# Patient Record
Sex: FEMALE | NOT HISPANIC OR LATINO | Employment: FULL TIME | URBAN - METROPOLITAN AREA
[De-identification: names, ages, dates, MRNs, and addresses within clinical notes are randomized per-mention and may not be internally consistent; named-entity substitution may affect disease eponyms.]

---

## 2023-11-03 DIAGNOSIS — E05.90 HYPERTHYROIDISM: ICD-10-CM

## 2023-11-03 DIAGNOSIS — E03.9 HYPOTHYROIDISM, UNSPECIFIED TYPE: Primary | ICD-10-CM

## 2023-11-03 PROBLEM — D17.1 LIPOMA OF BACK: Status: ACTIVE | Noted: 2023-11-03

## 2023-11-03 PROBLEM — G43.831: Status: ACTIVE | Noted: 2023-11-03

## 2023-11-03 PROBLEM — N73.9 FEMALE PELVIC INFLAMMATORY DISEASE: Status: ACTIVE | Noted: 2023-11-03

## 2023-11-03 PROBLEM — E83.52 HYPERCALCEMIA: Status: ACTIVE | Noted: 2023-11-03

## 2023-11-03 PROBLEM — D35.1 PARATHYROID ADENOMA: Status: ACTIVE | Noted: 2023-11-03

## 2023-11-03 PROBLEM — E06.3 AUTOIMMUNE THYROIDITIS: Status: ACTIVE | Noted: 2023-11-03

## 2023-11-03 PROBLEM — E66.01 MORBID (SEVERE) OBESITY DUE TO EXCESS CALORIES (MULTI): Status: ACTIVE | Noted: 2023-11-03

## 2023-11-03 PROBLEM — E55.9 VITAMIN D DEFICIENCY: Status: ACTIVE | Noted: 2023-11-03

## 2023-11-03 PROBLEM — E31.21: Status: ACTIVE | Noted: 2023-11-03

## 2023-11-03 PROBLEM — E21.0 PRIMARY HYPERPARATHYROIDISM (MULTI): Status: ACTIVE | Noted: 2023-11-03

## 2023-11-03 NOTE — TELEPHONE ENCOUNTER
Rx Request received. Med populated.  Last appointment over due for physical. LM for pt to call back, not sure who her pcp is, and she needs to schedule physical.      CARLY DELEON on 11/3/23 at 3:43 PM.

## 2023-11-06 RX ORDER — LEVOTHYROXINE SODIUM 100 UG/1
100 TABLET ORAL
Qty: 30 TABLET | Refills: 0 | OUTPATIENT
Start: 2023-11-06

## 2023-11-06 RX ORDER — HYDROCODONE BITARTRATE AND ACETAMINOPHEN 5; 325 MG/1; MG/1
1 TABLET ORAL EVERY 6 HOURS PRN
COMMUNITY
End: 2023-11-09 | Stop reason: WASHOUT

## 2023-11-06 RX ORDER — LEVOTHYROXINE SODIUM 100 UG/1
1 TABLET ORAL
COMMUNITY
End: 2023-11-08 | Stop reason: SDUPTHER

## 2023-11-06 RX ORDER — ERGOCALCIFEROL (VITAMIN D2) 50 MCG
1 CAPSULE ORAL DAILY
COMMUNITY
End: 2023-11-15 | Stop reason: WASHOUT

## 2023-11-06 RX ORDER — IBUPROFEN 200 MG
1 TABLET ORAL 3 TIMES DAILY PRN
COMMUNITY
End: 2024-03-07 | Stop reason: HOSPADM

## 2023-11-08 RX ORDER — LEVOTHYROXINE SODIUM 100 UG/1
100 TABLET ORAL
Qty: 30 TABLET | Refills: 0 | Status: SHIPPED | OUTPATIENT
Start: 2023-11-08 | End: 2023-11-15 | Stop reason: DRUGHIGH

## 2023-11-08 NOTE — TELEPHONE ENCOUNTER
Error - she did see me but last appt was 9/2022 when she was then advised to follow up for 6 week recheck of TSH. It it well over a year now. Rx refilled for 1 month.

## 2023-11-09 ENCOUNTER — PATIENT MESSAGE (OUTPATIENT)
Dept: PRIMARY CARE | Facility: CLINIC | Age: 33
End: 2023-11-09

## 2023-11-09 ENCOUNTER — OFFICE VISIT (OUTPATIENT)
Dept: PRIMARY CARE | Facility: CLINIC | Age: 33
End: 2023-11-09
Payer: COMMERCIAL

## 2023-11-09 VITALS
HEIGHT: 62 IN | SYSTOLIC BLOOD PRESSURE: 110 MMHG | DIASTOLIC BLOOD PRESSURE: 88 MMHG | WEIGHT: 239.8 LBS | TEMPERATURE: 95.4 F | OXYGEN SATURATION: 97 % | RESPIRATION RATE: 18 BRPM | BODY MASS INDEX: 44.13 KG/M2 | HEART RATE: 94 BPM

## 2023-11-09 DIAGNOSIS — G43.831: ICD-10-CM

## 2023-11-09 DIAGNOSIS — Z23 ENCOUNTER FOR IMMUNIZATION: ICD-10-CM

## 2023-11-09 DIAGNOSIS — E31.21: ICD-10-CM

## 2023-11-09 DIAGNOSIS — Z00.00 ANNUAL PHYSICAL EXAM: Primary | ICD-10-CM

## 2023-11-09 DIAGNOSIS — E66.01 MORBID (SEVERE) OBESITY DUE TO EXCESS CALORIES (MULTI): Primary | ICD-10-CM

## 2023-11-09 PROBLEM — E83.52 HYPERCALCEMIA: Status: RESOLVED | Noted: 2023-11-03 | Resolved: 2023-11-09

## 2023-11-09 PROBLEM — E05.90 HYPERTHYROIDISM: Status: RESOLVED | Noted: 2023-11-03 | Resolved: 2023-11-09

## 2023-11-09 PROBLEM — N73.9 FEMALE PELVIC INFLAMMATORY DISEASE: Status: RESOLVED | Noted: 2023-11-03 | Resolved: 2023-11-09

## 2023-11-09 PROBLEM — E06.3 AUTOIMMUNE THYROIDITIS: Status: RESOLVED | Noted: 2023-11-03 | Resolved: 2023-11-09

## 2023-11-09 PROCEDURE — 99395 PREV VISIT EST AGE 18-39: CPT | Performed by: FAMILY MEDICINE

## 2023-11-09 PROCEDURE — 90686 IIV4 VACC NO PRSV 0.5 ML IM: CPT | Performed by: FAMILY MEDICINE

## 2023-11-09 PROCEDURE — 90471 IMMUNIZATION ADMIN: CPT | Performed by: FAMILY MEDICINE

## 2023-11-09 RX ORDER — RIZATRIPTAN BENZOATE 10 MG/1
10 TABLET ORAL ONCE AS NEEDED
Qty: 9 TABLET | Refills: 3 | Status: SHIPPED | OUTPATIENT
Start: 2023-11-09 | End: 2024-11-08

## 2023-11-09 RX ORDER — PHENTERMINE AND TOPIRAMATE 3.75; 23 MG/1; MG/1
1 CAPSULE, EXTENDED RELEASE ORAL DAILY
Qty: 14 CAPSULE | Refills: 0 | Status: SHIPPED | OUTPATIENT
Start: 2023-11-09 | End: 2023-11-15 | Stop reason: WASHOUT

## 2023-11-09 RX ORDER — TOPIRAMATE 50 MG/1
50 TABLET, FILM COATED ORAL 2 TIMES DAILY
Qty: 60 TABLET | Refills: 6 | Status: SHIPPED | OUTPATIENT
Start: 2023-11-09 | End: 2024-11-08

## 2023-11-09 ASSESSMENT — PAIN SCALES - GENERAL: PAINLEVEL: 0-NO PAIN

## 2023-11-09 NOTE — PROGRESS NOTES
"History Of Present Illness  Tracy Villalobos is a 33 y.o. female presenting for \"Annual Exam (Physical).\"  Patient's last visit was 9/2022. She would like routine labs done.    She has hypothyroidism on unithyroid supplementation.  Her last TSH was elevated on 6/4/2022 at 17.32.  Her dose was adjusted and patient was to recheck TSH.  Patient was lost to follow-up.  She feels clinically euthyroid other than difficulty with weight loss.  She has a history of intermittent migraines.  Sometimes are difficult to abort with over-the-counter medications.  She has been on Maxalt in the past and reports that it is effective.  She is working on weight loss but Qsymia is too expensive.             Past Medical History  Patient Active Problem List    Diagnosis Date Noted    Hypothyroidism 11/03/2023    Lipoma of back 11/03/2023    MEN1 (multiple endocrine neoplasia) (CMS/[facility]) 11/03/2023    Migraine, menstrual, intractable, with status migrainosus 11/03/2023    Morbid (severe) obesity due to excess calories (CMS/[facility]) 11/03/2023    Parathyroid adenoma 11/03/2023    Primary hyperparathyroidism (CMS/[facility]) 11/03/2023    Vitamin D deficiency 11/03/2023    History of gestational diabetes mellitus 10/22/2015        Medications  Current Outpatient Medications on File Prior to Visit   Medication Sig Dispense Refill    calcium carb/vit D3/minerals (CALCIUM CARBONATE-VIT D3-MIN ORAL) Take 2 tablets by mouth 2 times a day.      ibuprofen 200 mg tablet Take 1 tablet (200 mg) by mouth 3 times a day as needed.      levonorgestrel (Mirena) 21 mcg/24 hours (8 yrs) 52 mg IUD 52 mg by intrauterine route 1 time.      [DISCONTINUED] ergocalciferol (Vitamin D-2) 50 MCG (2000 UT) capsule capsule Take 1 capsule (50 mcg) by mouth once daily.      [DISCONTINUED] HYDROcodone-acetaminophen (Norco) 5-325 mg tablet Take 1 tablet by mouth every 6 hours if needed.      [DISCONTINUED] Unithroid 100 mcg tablet Take 1 tablet (100 mcg) by mouth once daily in the " morning. Take before meals. 30 tablet 0     No current facility-administered medications on file prior to visit.        Surgical History  She has no past surgical history on file.     Social History  She reports that she quit smoking about 10 years ago. Her smoking use included cigarettes. She has never used smokeless tobacco. She reports current alcohol use. She reports that she does not use drugs.    Family History  Family History   Problem Relation Name Age of Onset    Other (MEN 1) Mother          requiring parathyroid removal. Sees     Hypertension Father      No Known Problems Brother      No Known Problems Brother      No Known Problems Daughter      Other (MEN 1) Mother's Brother          requiring parathyroid removal    Other (MEN 1) Maternal Grandmother          requiring parathyroid removal    Breast cancer Maternal Grandmother          Allergies  Penicillins    ROS  Negative, except in HPI     Last Recorded Vitals  /88   Pulse 94   Temp 35.2 °C (95.4 °F)   Resp 18   Wt 109 kg (239 lb 12.8 oz)   SpO2 97%   Body mass index is 43.86 kg/m².     Physical Exam  Vitals and nursing note reviewed.   Constitutional:       Appearance: Normal appearance.   HENT:      Head: Normocephalic.      Right Ear: Tympanic membrane normal.      Left Ear: Tympanic membrane normal.      Nose: Nose normal.      Mouth/Throat:      Mouth: Mucous membranes are moist.   Eyes:      Extraocular Movements: Extraocular movements intact.      Conjunctiva/sclera: Conjunctivae normal.      Pupils: Pupils are equal, round, and reactive to light.   Cardiovascular:      Rate and Rhythm: Normal rate and regular rhythm.      Heart sounds: Normal heart sounds.   Pulmonary:      Effort: Pulmonary effort is normal. No respiratory distress.      Breath sounds: Normal breath sounds.   Abdominal:      General: Abdomen is flat.      Palpations: Abdomen is soft.      Tenderness: There is no abdominal tenderness.   Musculoskeletal:       Cervical back: Neck supple.   Lymphadenopathy:      Cervical: No cervical adenopathy.   Skin:     General: Skin is warm and dry.      Findings: No rash.   Neurological:      General: No focal deficit present.      Mental Status: She is alert. Mental status is at baseline.      Coordination: Coordination normal.      Gait: Gait normal.      Deep Tendon Reflexes: Reflexes normal.   Psychiatric:         Mood and Affect: Mood normal.         Behavior: Behavior normal.         Relevant Results      Assessment/Plan   Tracy was seen today for annual exam.  Diagnoses and all orders for this visit:  Annual physical exam (Primary)  -     Comprehensive Metabolic Panel; Future  -     TSH with reflex to Free T4 if abnormal; Future  -     Hemoglobin A1C; Future  -     CBC; Future  -     Lipid Panel; Future  -     Referral to Gynecology; Future  -     Vitamin D 25-Hydroxy,Total (for eval of Vitamin D levels); Future  Encounter for immunization  -     Flu vaccine (IIV4) age 6 months and greater, preservative free  Migraine, menstrual, intractable, with status migrainosus  -     rizatriptan (Maxalt) 10 mg tablet; Take 1 tablet (10 mg) by mouth 1 time if needed for migraine. May repeat in 2 hours if unresolved. Do not exceed 30 mg in 24 hours.  MEN1 (multiple endocrine neoplasia) (CMS/HCC)  Comments:  will see Endo  BMI 40.0-44.9, adult (CMS/HCC)  -     Discontinue: phentermine-topiramate (Qsymia) 3.75-23 mg capsule, ER multiphase 24 hr; Take 1 capsule by mouth once daily for 14 days.  Other orders  -     Follow Up In Primary Care - Established; Future        Hector Hackett MD

## 2023-11-10 ENCOUNTER — LAB (OUTPATIENT)
Dept: LAB | Facility: LAB | Age: 33
End: 2023-11-10
Payer: COMMERCIAL

## 2023-11-10 DIAGNOSIS — Z00.00 ANNUAL PHYSICAL EXAM: ICD-10-CM

## 2023-11-10 LAB
25(OH)D3 SERPL-MCNC: 26 NG/ML (ref 31–100)
ALBUMIN SERPL-MCNC: 4.1 G/DL (ref 3.5–5)
ALP BLD-CCNC: 86 U/L (ref 35–125)
ALT SERPL-CCNC: 17 U/L (ref 5–40)
ANION GAP SERPL CALC-SCNC: 15 MMOL/L
AST SERPL-CCNC: 14 U/L (ref 5–40)
BILIRUB SERPL-MCNC: 0.4 MG/DL (ref 0.1–1.2)
BUN SERPL-MCNC: 11 MG/DL (ref 8–25)
CALCIUM SERPL-MCNC: 9.3 MG/DL (ref 8.5–10.4)
CHLORIDE SERPL-SCNC: 98 MMOL/L (ref 97–107)
CHOLEST SERPL-MCNC: 190 MG/DL (ref 133–200)
CHOLEST/HDLC SERPL: 3.8 {RATIO}
CO2 SERPL-SCNC: 23 MMOL/L (ref 24–31)
CREAT SERPL-MCNC: 0.8 MG/DL (ref 0.4–1.6)
ERYTHROCYTE [DISTWIDTH] IN BLOOD BY AUTOMATED COUNT: 12.3 % (ref 11.5–14.5)
EST. AVERAGE GLUCOSE BLD GHB EST-MCNC: 258 MG/DL
GFR SERPL CREATININE-BSD FRML MDRD: >90 ML/MIN/1.73M*2
GLUCOSE SERPL-MCNC: 325 MG/DL (ref 65–99)
HBA1C MFR BLD: 10.6 %
HCT VFR BLD AUTO: 40.7 % (ref 36–46)
HDLC SERPL-MCNC: 50 MG/DL
HGB BLD-MCNC: 13.1 G/DL (ref 12–16)
LDLC SERPL CALC-MCNC: 123 MG/DL (ref 65–130)
MCH RBC QN AUTO: 29.4 PG (ref 26–34)
MCHC RBC AUTO-ENTMCNC: 32.2 G/DL (ref 32–36)
MCV RBC AUTO: 91 FL (ref 80–100)
NRBC BLD-RTO: 0 /100 WBCS (ref 0–0)
PLATELET # BLD AUTO: 356 X10*3/UL (ref 150–450)
POTASSIUM SERPL-SCNC: 4.5 MMOL/L (ref 3.4–5.1)
PROT SERPL-MCNC: 7.3 G/DL (ref 5.9–7.9)
RBC # BLD AUTO: 4.46 X10*6/UL (ref 4–5.2)
SODIUM SERPL-SCNC: 136 MMOL/L (ref 133–145)
T4 FREE SERPL-MCNC: 1.3 NG/DL (ref 0.9–1.7)
TRIGL SERPL-MCNC: 84 MG/DL (ref 40–150)
TSH SERPL DL<=0.05 MIU/L-ACNC: 6.38 MIU/L (ref 0.27–4.2)
WBC # BLD AUTO: 8.7 X10*3/UL (ref 4.4–11.3)

## 2023-11-10 PROCEDURE — 84439 ASSAY OF FREE THYROXINE: CPT

## 2023-11-10 PROCEDURE — 83036 HEMOGLOBIN GLYCOSYLATED A1C: CPT

## 2023-11-10 PROCEDURE — 85027 COMPLETE CBC AUTOMATED: CPT

## 2023-11-10 PROCEDURE — 80053 COMPREHEN METABOLIC PANEL: CPT

## 2023-11-10 PROCEDURE — 84443 ASSAY THYROID STIM HORMONE: CPT

## 2023-11-10 PROCEDURE — 80061 LIPID PANEL: CPT

## 2023-11-10 PROCEDURE — 36415 COLL VENOUS BLD VENIPUNCTURE: CPT

## 2023-11-10 PROCEDURE — 82306 VITAMIN D 25 HYDROXY: CPT

## 2023-11-15 ENCOUNTER — TELEMEDICINE (OUTPATIENT)
Dept: PRIMARY CARE | Facility: CLINIC | Age: 33
End: 2023-11-15
Payer: COMMERCIAL

## 2023-11-15 DIAGNOSIS — E03.9 HYPOTHYROIDISM, UNSPECIFIED TYPE: ICD-10-CM

## 2023-11-15 DIAGNOSIS — E11.65 TYPE 2 DIABETES MELLITUS WITH HYPERGLYCEMIA, WITHOUT LONG-TERM CURRENT USE OF INSULIN (MULTI): Primary | ICD-10-CM

## 2023-11-15 PROCEDURE — 99213 OFFICE O/P EST LOW 20 MIN: CPT | Performed by: FAMILY MEDICINE

## 2023-11-15 RX ORDER — LEVOTHYROXINE SODIUM 112 UG/1
112 TABLET ORAL
Qty: 90 TABLET | Refills: 0 | Status: SHIPPED | OUTPATIENT
Start: 2023-11-15 | End: 2024-11-14

## 2023-11-15 RX ORDER — SEMAGLUTIDE 1.34 MG/ML
0.25 INJECTION, SOLUTION SUBCUTANEOUS
Qty: 1 EACH | Refills: 0 | Status: SHIPPED | OUTPATIENT
Start: 2023-11-15 | End: 2023-11-28 | Stop reason: SDUPTHER

## 2023-11-15 ASSESSMENT — ENCOUNTER SYMPTOMS
VISUAL CHANGE: 0
TREMORS: 0
POLYDIPSIA: 0
SWEATS: 0
SEIZURES: 0
HEADACHES: 1
WEIGHT LOSS: 0
HUNGER: 0
POLYPHAGIA: 0
WEAKNESS: 0
NERVOUS/ANXIOUS: 1
DIZZINESS: 0
BLURRED VISION: 0
SPEECH DIFFICULTY: 0
FATIGUE: 1
CONFUSION: 0
BLACKOUTS: 0

## 2023-11-15 ASSESSMENT — PAIN SCALES - GENERAL: PAINLEVEL: 0-NO PAIN

## 2023-11-15 NOTE — PROGRESS NOTES
With patient's permission this is a telemedicine visit with video and audio.  History Of Present Illness  Tracy Villalobos is a 33 y.o. female who calls in for Blood Work (Blood work follow up and discuss med changes ).  Calling to discuss newly diagnosed type 2 diabetes.  Hemoglobin A1c was 10.6 on routine testing on 11/10/2023.  We discussed lifestyle changes and modification, surveillance.  Also discussed treatment options.   Hypothyroidism was also off.  TSH was elevated at 6.38 with a normal T4.    She has an upcoming appointment with endocrinology.        Past Medical History  She has a past medical history of Diabetes (CMS/[facility]).    Medications  Current Outpatient Medications   Medication Instructions    calcium carb/vit D3/minerals (CALCIUM CARBONATE-VIT D3-MIN ORAL) 2 tablets, oral, 2 times daily    ibuprofen 200 mg tablet 1 tablet, oral, 3 times daily PRN    levonorgestrel (Mirena) 21 mcg/24 hours (8 yrs) 52 mg IUD 1 each, intrauterine, Once    levothyroxine (UNITHROID) 112 mcg, oral, Daily before breakfast    Ozempic 0.25 mg, subcutaneous, Weekly    rizatriptan (MAXALT) 10 mg, oral, Once as needed, May repeat in 2 hours if unresolved. Do not exceed 30 mg in 24 hours.    topiramate (TOPAMAX) 50 mg, oral, 2 times daily        Surgical History  She has no past surgical history on file.     Social History  She reports that she quit smoking about 10 years ago. Her smoking use included cigarettes. She has never used smokeless tobacco. She reports current alcohol use. She reports that she does not use drugs.    Family History  Family History   Problem Relation Name Age of Onset    Other (MEN 1) Mother          requiring parathyroid removal. Sees     Hypertension Father      No Known Problems Brother      No Known Problems Brother      No Known Problems Daughter      Other (MEN 1) Mother's Brother          requiring parathyroid removal    Other (MEN 1) Maternal Grandmother          requiring parathyroid  removal    Breast cancer Maternal Grandmother          Allergies  Penicillins    On video:     Appearance: Normal appearance.      Effort: Respiratory effort is normal. Speaking in full sentences. No respiratory distress.     Mood and Affect: Mood normal.         Thought Content: Thought content normal.         Judgment: Judgment normal.      Last Recorded Vitals  There were no vitals taken for this visit.  (Vitals are taken by patient at home, if any reported)    Relevant Results  Component      Latest Ref Rng 11/10/2023   GLUCOSE      65 - 99 mg/dL 325 (H)    SODIUM      133 - 145 mmol/L 136    POTASSIUM      3.4 - 5.1 mmol/L 4.5    CHLORIDE      97 - 107 mmol/L 98    Bicarbonate      24 - 31 mmol/L 23 (L)    Blood Urea Nitrogen      8 - 25 mg/dL 11    Creatinine      0.40 - 1.60 mg/dL 0.80    EGFR      >60 mL/min/1.73m*2 >90    Calcium      8.5 - 10.4 mg/dL 9.3    Albumin      3.5 - 5.0 g/dL 4.1    Alkaline Phosphatase      35 - 125 U/L 86    Total Protein      5.9 - 7.9 g/dL 7.3    AST      5 - 40 U/L 14    Bilirubin Total      0.1 - 1.2 mg/dL 0.4    ALT      5 - 40 U/L 17    Anion Gap      <=19 mmol/L 15    WBC      4.4 - 11.3 x10*3/uL 8.7    nRBC      0.0 - 0.0 /100 WBCs 0.0    RBC      4.00 - 5.20 x10*6/uL 4.46    HEMOGLOBIN      12.0 - 16.0 g/dL 13.1    HEMATOCRIT      36.0 - 46.0 % 40.7    MCV      80 - 100 fL 91    MCH      26.0 - 34.0 pg 29.4    MCHC      32.0 - 36.0 g/dL 32.2    RED CELL DISTRIBUTION WIDTH      11.5 - 14.5 % 12.3    Platelets      150 - 450 x10*3/uL 356    CHOLESTEROL      133 - 200 mg/dL 190    HDL CHOLESTEROL      >50.0 mg/dL 50.0 (L)    Cholesterol/HDL Ratio      SEE COMMENT  3.8    LDL Calculated      65 - 130 mg/dL 123    TRIGLYCERIDES      40 - 150 mg/dL 84    Hemoglobin A1C      See below % 10.6 (H)    Estimated Average Glucose      Not Established mg/dL 258    Thyroid Stimulating Hormone      0.27 - 4.20 mIU/L 6.38 (H)    Vitamin D, 25-Hydroxy, Total      31 - 100 ng/mL 26 (L)     Thyroxine, Free      0.90 - 1.70 ng/dL 1.30       Legend:  (H) High  (L) Low    Assessment/Plan   Tracy was seen today for blood work.  Diagnoses and all orders for this visit:  Type 2 diabetes mellitus with hyperglycemia, without long-term current use of insulin (CMS/[facility]) (Primary)  -     semaglutide (Ozempic) 0.25 mg or 0.5 mg(2 mg/1.5 mL) pen injector; Inject 0.25 mg under the skin 1 (one) time per week.  -     Hemoglobin A1C; Future  Hypothyroidism, unspecified type  -     levothyroxine (Unithroid) 112 mcg tablet; Take 1 tablet (112 mcg) by mouth once daily in the morning. Take before meals.  -     Tsh With Reflex To Free T4 If Abnormal; Future        Trial of Ozempic to see if insurance covers it.  She will be seeing endocrinology  who can then take over management of her diabetes and thyroid disease.  Hector Hackett MD

## 2023-11-16 ENCOUNTER — PATIENT MESSAGE (OUTPATIENT)
Dept: PRIMARY CARE | Facility: CLINIC | Age: 33
End: 2023-11-16
Payer: COMMERCIAL

## 2023-11-16 DIAGNOSIS — E11.65 TYPE 2 DIABETES MELLITUS WITH HYPERGLYCEMIA, WITHOUT LONG-TERM CURRENT USE OF INSULIN (MULTI): ICD-10-CM

## 2023-11-20 ENCOUNTER — TELEPHONE (OUTPATIENT)
Dept: PRIMARY CARE | Facility: CLINIC | Age: 33
End: 2023-11-20

## 2023-11-28 RX ORDER — SEMAGLUTIDE 1.34 MG/ML
0.25 INJECTION, SOLUTION SUBCUTANEOUS
Qty: 1 EACH | Refills: 0 | Status: SHIPPED | OUTPATIENT
Start: 2023-11-28 | End: 2023-12-28

## 2023-12-15 ENCOUNTER — APPOINTMENT (OUTPATIENT)
Dept: OBSTETRICS AND GYNECOLOGY | Facility: CLINIC | Age: 33
End: 2023-12-15
Payer: COMMERCIAL

## 2023-12-22 ENCOUNTER — LAB (OUTPATIENT)
Dept: LAB | Facility: LAB | Age: 33
End: 2023-12-22
Payer: COMMERCIAL

## 2023-12-22 ENCOUNTER — APPOINTMENT (OUTPATIENT)
Dept: OBSTETRICS AND GYNECOLOGY | Facility: CLINIC | Age: 33
End: 2023-12-22
Payer: COMMERCIAL

## 2023-12-22 DIAGNOSIS — E03.9 HYPOTHYROIDISM, UNSPECIFIED TYPE: ICD-10-CM

## 2023-12-22 LAB — TSH SERPL DL<=0.05 MIU/L-ACNC: 2.2 MIU/L (ref 0.27–4.2)

## 2023-12-22 PROCEDURE — 36415 COLL VENOUS BLD VENIPUNCTURE: CPT

## 2023-12-22 PROCEDURE — 84443 ASSAY THYROID STIM HORMONE: CPT

## 2024-01-26 ENCOUNTER — OFFICE VISIT (OUTPATIENT)
Dept: OBSTETRICS AND GYNECOLOGY | Facility: CLINIC | Age: 34
End: 2024-01-26
Payer: COMMERCIAL

## 2024-01-26 ENCOUNTER — HOSPITAL ENCOUNTER (OUTPATIENT)
Dept: RADIOLOGY | Facility: CLINIC | Age: 34
Discharge: HOME | End: 2024-01-26
Payer: COMMERCIAL

## 2024-01-26 VITALS — HEIGHT: 62 IN | WEIGHT: 209 LBS | BODY MASS INDEX: 38.46 KG/M2

## 2024-01-26 DIAGNOSIS — Z01.419 ENCOUNTER FOR ANNUAL ROUTINE GYNECOLOGICAL EXAMINATION: Primary | ICD-10-CM

## 2024-01-26 DIAGNOSIS — N89.8 VAGINAL ODOR: ICD-10-CM

## 2024-01-26 DIAGNOSIS — Z30.431 INTRAUTERINE DEVICE SURVEILLANCE: ICD-10-CM

## 2024-01-26 DIAGNOSIS — Z11.51 SCREENING FOR HUMAN PAPILLOMAVIRUS (HPV): ICD-10-CM

## 2024-01-26 DIAGNOSIS — Z20.2 POSSIBLE EXPOSURE TO STD: ICD-10-CM

## 2024-01-26 PROCEDURE — 99385 PREV VISIT NEW AGE 18-39: CPT

## 2024-01-26 PROCEDURE — 1036F TOBACCO NON-USER: CPT

## 2024-01-26 PROCEDURE — 87661 TRICHOMONAS VAGINALIS AMPLIF: CPT

## 2024-01-26 PROCEDURE — 76856 US EXAM PELVIC COMPLETE: CPT

## 2024-01-26 PROCEDURE — 87800 DETECT AGNT MULT DNA DIREC: CPT

## 2024-01-26 PROCEDURE — 88175 CYTOPATH C/V AUTO FLUID REDO: CPT | Mod: TC

## 2024-01-26 PROCEDURE — 3008F BODY MASS INDEX DOCD: CPT

## 2024-01-26 PROCEDURE — 87624 HPV HI-RISK TYP POOLED RSLT: CPT

## 2024-01-26 PROCEDURE — 87205 SMEAR GRAM STAIN: CPT | Mod: WESLAB

## 2024-01-26 ASSESSMENT — ENCOUNTER SYMPTOMS
FEVER: 0
COLOR CHANGE: 0
VOMITING: 0
FATIGUE: 0
DYSURIA: 0
DIZZINESS: 0
UNEXPECTED WEIGHT CHANGE: 0
DEPRESSION: 0
SHORTNESS OF BREATH: 0
HEADACHES: 0
OCCASIONAL FEELINGS OF UNSTEADINESS: 0
LOSS OF SENSATION IN FEET: 0
NAUSEA: 0
ABDOMINAL PAIN: 0
CHILLS: 0
COUGH: 0

## 2024-01-26 ASSESSMENT — PAIN SCALES - GENERAL: PAINLEVEL: 0-NO PAIN

## 2024-01-26 ASSESSMENT — LIFESTYLE VARIABLES
SKIP TO QUESTIONS 9-10: 1
AUDIT-C TOTAL SCORE: 2
HOW OFTEN DO YOU HAVE A DRINK CONTAINING ALCOHOL: 2-4 TIMES A MONTH
HOW OFTEN DO YOU HAVE SIX OR MORE DRINKS ON ONE OCCASION: NEVER
HOW MANY STANDARD DRINKS CONTAINING ALCOHOL DO YOU HAVE ON A TYPICAL DAY: 1 OR 2

## 2024-01-26 ASSESSMENT — PATIENT HEALTH QUESTIONNAIRE - PHQ9
1. LITTLE INTEREST OR PLEASURE IN DOING THINGS: NOT AT ALL
SUM OF ALL RESPONSES TO PHQ9 QUESTIONS 1 & 2: 0
2. FEELING DOWN, DEPRESSED OR HOPELESS: NOT AT ALL

## 2024-01-26 NOTE — PROGRESS NOTES
"Subjective   Tracy Villalobos is a 33 y.o. female who is here for a routine GYN exam. New patient with the practice. Had Mirena IUD inserted  at outside facility; has regular menses once monthly with IUD, has always been like this since insertion; duration 7-8 days, heavy first 1-3 days then lightens. Denies pelvic pains. No recent sexual activity however previous partner cheated and she wants to ensure no STI exposures. Does note some intermittent vaginal odor. Denies abnormal discharge, itching, or burning today. Denies urinary symptoms. Denies breast changes or concerns.     Complaints:  see HPI  Periods: regular  Dysmenorrhea:  none    Current contraception: Mirena IUD  History of abnormal Pap smear: no  History of abnormal mammogram: no      OB History          2    Para   1    Term   1            AB   1    Living   1         SAB        IAB        Ectopic        Multiple        Live Births   1                  Review of Systems   Constitutional:  Negative for chills, fatigue, fever and unexpected weight change.   Respiratory:  Negative for cough and shortness of breath.    Gastrointestinal:  Negative for abdominal pain, nausea and vomiting.   Genitourinary:  Negative for dyspareunia, dysuria, pelvic pain and vaginal discharge.        + vaginal odor   Skin:  Negative for color change and rash.   Neurological:  Negative for dizziness and headaches.       Objective   Ht 1.575 m (5' 2\")   Wt 94.8 kg (209 lb)   LMP 2024   BMI 38.23 kg/m²        General:   Alert and oriented, in no acute distress   Neck: Supple. No visible thyromegaly.    Breast/Axilla: Normal to palpation bilaterally without masses, skin changes, or nipple discharge.    Abdomen: Soft, non-tender, without masses or organomegaly   Vulva: Normal architecture without erythema, masses, or lesions.    Vagina: Normal mucosa without lesions, masses, or atrophy. Menstrual blood mixed with small amount yellow discharge. Swab obtained "    Cervix: Normal without masses, lesions, or signs of cervicitis; pap performed with STI testing; unable to visualize IUD strings   Uterus: Normal, mobile, non-enlarged uterus, non-tender   Adnexa: Normal without masses or lesions, non-tender   Pelvic Floor Normal    Psych Normal affect. Normal mood.      Assessment/Plan   -Due for pap smear, obtained.  -Vaginal odor symptoms intermittently; swab obtained to rule out for yeast/BV infection.  -STI testing obtained to rule out possible exposures with partner hx.  -Mirena IUD since 2021; inserted at outside facility; unable to visualize strings on exam; pelvic ultrasound ordered to ensure appropriate positioning.   -She would like to obtain baseline mammogram at 33 yo. Fam hx (grandma) of breast cancer.    Riya Solano PA-C

## 2024-01-27 LAB
CLUE CELLS VAG LPF-#/AREA: PRESENT /[LPF]
NUGENT SCORE: 9
YEAST VAG WET PREP-#/AREA: ABNORMAL

## 2024-01-29 ENCOUNTER — CLINICAL SUPPORT (OUTPATIENT)
Dept: PRIMARY CARE | Facility: CLINIC | Age: 34
End: 2024-01-29
Payer: COMMERCIAL

## 2024-01-29 DIAGNOSIS — Z11.1 SCREENING EXAMINATION FOR PULMONARY TUBERCULOSIS: ICD-10-CM

## 2024-01-29 DIAGNOSIS — B96.89 BACTERIAL VAGINOSIS: Primary | ICD-10-CM

## 2024-01-29 DIAGNOSIS — N76.0 BACTERIAL VAGINOSIS: Primary | ICD-10-CM

## 2024-01-29 RX ORDER — METRONIDAZOLE 7.5 MG/G
GEL VAGINAL DAILY
Qty: 70 G | Refills: 0 | Status: SHIPPED | OUTPATIENT
Start: 2024-01-29 | End: 2024-02-03

## 2024-01-30 LAB
C TRACH RRNA SPEC QL NAA+PROBE: NEGATIVE
N GONORRHOEA DNA SPEC QL PROBE+SIG AMP: NEGATIVE
T VAGINALIS RRNA SPEC QL NAA+PROBE: NEGATIVE

## 2024-01-31 ENCOUNTER — CLINICAL SUPPORT (OUTPATIENT)
Dept: PRIMARY CARE | Facility: CLINIC | Age: 34
End: 2024-01-31
Payer: COMMERCIAL

## 2024-01-31 DIAGNOSIS — Z11.1 VISIT FOR TB SKIN TEST: ICD-10-CM

## 2024-01-31 LAB
INDURATION: 0 MM
TB SKIN TEST: NEGATIVE

## 2024-02-05 ENCOUNTER — CLINICAL SUPPORT (OUTPATIENT)
Dept: PRIMARY CARE | Facility: CLINIC | Age: 34
End: 2024-02-05
Payer: COMMERCIAL

## 2024-02-05 DIAGNOSIS — Z11.1 VISIT FOR TB SKIN TEST: Primary | ICD-10-CM

## 2024-02-05 PROCEDURE — 86580 TB INTRADERMAL TEST: CPT | Performed by: FAMILY MEDICINE

## 2024-02-07 ENCOUNTER — CLINICAL SUPPORT (OUTPATIENT)
Dept: PRIMARY CARE | Facility: CLINIC | Age: 34
End: 2024-02-07
Payer: COMMERCIAL

## 2024-02-07 DIAGNOSIS — Z11.1 VISIT FOR TB SKIN TEST: ICD-10-CM

## 2024-02-07 LAB
INDURATION: 0 MM
TB SKIN TEST: NEGATIVE

## 2024-02-07 NOTE — PROGRESS NOTES
Pt came in to have TB skin test read. Test was negative and no induration present. Results reports have been printed for pt.

## 2024-02-12 LAB
CYTOLOGY CMNT CVX/VAG CYTO-IMP: NORMAL
HPV HR 12 DNA GENITAL QL NAA+PROBE: NEGATIVE
HPV HR GENOTYPES PNL CVX NAA+PROBE: POSITIVE
HPV16 DNA SPEC QL NAA+PROBE: NEGATIVE
HPV18 DNA SPEC QL NAA+PROBE: POSITIVE
LAB AP CONTRACEPTIVE HISTORY: NORMAL
LAB AP HPV GENOTYPE QUESTION: YES
LAB AP HPV HR: NORMAL
LAB AP PAP ADDITIONAL TESTS: NORMAL
LABORATORY COMMENT REPORT: NORMAL
LMP START DATE: NORMAL
PATH REPORT.TOTAL CANCER: NORMAL

## 2024-03-06 ENCOUNTER — HOSPITAL ENCOUNTER (INPATIENT)
Facility: HOSPITAL | Age: 34
End: 2024-03-06
Attending: SURGERY | Admitting: SURGERY
Payer: COMMERCIAL

## 2024-03-06 ENCOUNTER — APPOINTMENT (OUTPATIENT)
Dept: RADIOLOGY | Facility: HOSPITAL | Age: 34
End: 2024-03-06
Payer: COMMERCIAL

## 2024-03-06 ENCOUNTER — TELEPHONE (OUTPATIENT)
Dept: PRIMARY CARE | Facility: CLINIC | Age: 34
End: 2024-03-06

## 2024-03-06 ENCOUNTER — HOSPITAL ENCOUNTER (OUTPATIENT)
Facility: HOSPITAL | Age: 34
Setting detail: OBSERVATION
Discharge: HOME | End: 2024-03-07
Attending: STUDENT IN AN ORGANIZED HEALTH CARE EDUCATION/TRAINING PROGRAM | Admitting: SURGERY
Payer: COMMERCIAL

## 2024-03-06 DIAGNOSIS — K35.80 ACUTE APPENDICITIS, UNCOMPLICATED: Primary | ICD-10-CM

## 2024-03-06 PROBLEM — K37 APPENDICITIS: Status: ACTIVE | Noted: 2024-03-06

## 2024-03-06 LAB
ALBUMIN SERPL-MCNC: 4.2 G/DL (ref 3.5–5)
ALP BLD-CCNC: 95 U/L (ref 35–125)
ALT SERPL-CCNC: 20 U/L (ref 5–40)
ANION GAP SERPL CALC-SCNC: 14 MMOL/L
APPEARANCE UR: ABNORMAL
AST SERPL-CCNC: 16 U/L (ref 5–40)
BASOPHILS # BLD AUTO: 0.05 X10*3/UL (ref 0–0.1)
BASOPHILS NFR BLD AUTO: 0.3 %
BILIRUB SERPL-MCNC: 0.6 MG/DL (ref 0.1–1.2)
BILIRUB UR STRIP.AUTO-MCNC: NEGATIVE MG/DL
BUN SERPL-MCNC: 10 MG/DL (ref 8–25)
CALCIUM SERPL-MCNC: 10.2 MG/DL (ref 8.5–10.4)
CHLORIDE SERPL-SCNC: 100 MMOL/L (ref 97–107)
CO2 SERPL-SCNC: 21 MMOL/L (ref 24–31)
COLOR UR: ABNORMAL
CREAT SERPL-MCNC: 0.7 MG/DL (ref 0.4–1.6)
EGFRCR SERPLBLD CKD-EPI 2021: >90 ML/MIN/1.73M*2
EOSINOPHIL # BLD AUTO: 0.12 X10*3/UL (ref 0–0.7)
EOSINOPHIL NFR BLD AUTO: 0.7 %
ERYTHROCYTE [DISTWIDTH] IN BLOOD BY AUTOMATED COUNT: 11.9 % (ref 11.5–14.5)
GLUCOSE SERPL-MCNC: 123 MG/DL (ref 65–99)
GLUCOSE UR STRIP.AUTO-MCNC: NORMAL MG/DL
HCG SERPL-ACNC: <1 MIU/ML
HCT VFR BLD AUTO: 37.7 % (ref 36–46)
HGB BLD-MCNC: 12.7 G/DL (ref 12–16)
IMM GRANULOCYTES # BLD AUTO: 0.07 X10*3/UL (ref 0–0.7)
IMM GRANULOCYTES NFR BLD AUTO: 0.4 % (ref 0–0.9)
KETONES UR STRIP.AUTO-MCNC: ABNORMAL MG/DL
LEUKOCYTE ESTERASE UR QL STRIP.AUTO: ABNORMAL
LIPASE SERPL-CCNC: 18 U/L (ref 16–63)
LYMPHOCYTES # BLD AUTO: 1.73 X10*3/UL (ref 1.2–4.8)
LYMPHOCYTES NFR BLD AUTO: 10 %
MCH RBC QN AUTO: 30 PG (ref 26–34)
MCHC RBC AUTO-ENTMCNC: 33.7 G/DL (ref 32–36)
MCV RBC AUTO: 89 FL (ref 80–100)
MONOCYTES # BLD AUTO: 1.28 X10*3/UL (ref 0.1–1)
MONOCYTES NFR BLD AUTO: 7.4 %
MUCOUS THREADS #/AREA URNS AUTO: ABNORMAL /LPF
NEUTROPHILS # BLD AUTO: 14.05 X10*3/UL (ref 1.2–7.7)
NEUTROPHILS NFR BLD AUTO: 81.2 %
NITRITE UR QL STRIP.AUTO: NEGATIVE
NRBC BLD-RTO: 0 /100 WBCS (ref 0–0)
PH UR STRIP.AUTO: 5 [PH]
PLATELET # BLD AUTO: 385 X10*3/UL (ref 150–450)
POTASSIUM SERPL-SCNC: 3.6 MMOL/L (ref 3.4–5.1)
PROT SERPL-MCNC: 8.2 G/DL (ref 5.9–7.9)
PROT UR STRIP.AUTO-MCNC: NEGATIVE MG/DL
RBC # BLD AUTO: 4.24 X10*6/UL (ref 4–5.2)
RBC # UR STRIP.AUTO: NEGATIVE /UL
RBC #/AREA URNS AUTO: ABNORMAL /HPF
SODIUM SERPL-SCNC: 135 MMOL/L (ref 133–145)
SP GR UR STRIP.AUTO: 1.01
SQUAMOUS #/AREA URNS AUTO: ABNORMAL /HPF
UROBILINOGEN UR STRIP.AUTO-MCNC: NORMAL MG/DL
WBC # BLD AUTO: 17.3 X10*3/UL (ref 4.4–11.3)
WBC #/AREA URNS AUTO: ABNORMAL /HPF

## 2024-03-06 PROCEDURE — 96365 THER/PROPH/DIAG IV INF INIT: CPT

## 2024-03-06 PROCEDURE — 96375 TX/PRO/DX INJ NEW DRUG ADDON: CPT

## 2024-03-06 PROCEDURE — 84702 CHORIONIC GONADOTROPIN TEST: CPT | Performed by: STUDENT IN AN ORGANIZED HEALTH CARE EDUCATION/TRAINING PROGRAM

## 2024-03-06 PROCEDURE — 2500000004 HC RX 250 GENERAL PHARMACY W/ HCPCS (ALT 636 FOR OP/ED): Performed by: SURGERY

## 2024-03-06 PROCEDURE — 85025 COMPLETE CBC W/AUTO DIFF WBC: CPT | Performed by: STUDENT IN AN ORGANIZED HEALTH CARE EDUCATION/TRAINING PROGRAM

## 2024-03-06 PROCEDURE — 83690 ASSAY OF LIPASE: CPT | Performed by: STUDENT IN AN ORGANIZED HEALTH CARE EDUCATION/TRAINING PROGRAM

## 2024-03-06 PROCEDURE — 99285 EMERGENCY DEPT VISIT HI MDM: CPT | Mod: 25

## 2024-03-06 PROCEDURE — 80053 COMPREHEN METABOLIC PANEL: CPT | Performed by: STUDENT IN AN ORGANIZED HEALTH CARE EDUCATION/TRAINING PROGRAM

## 2024-03-06 PROCEDURE — 96366 THER/PROPH/DIAG IV INF ADDON: CPT

## 2024-03-06 PROCEDURE — 74177 CT ABD & PELVIS W/CONTRAST: CPT | Performed by: RADIOLOGY

## 2024-03-06 PROCEDURE — 96361 HYDRATE IV INFUSION ADD-ON: CPT

## 2024-03-06 PROCEDURE — 96376 TX/PRO/DX INJ SAME DRUG ADON: CPT

## 2024-03-06 PROCEDURE — 96368 THER/DIAG CONCURRENT INF: CPT

## 2024-03-06 PROCEDURE — G0378 HOSPITAL OBSERVATION PER HR: HCPCS

## 2024-03-06 PROCEDURE — 87086 URINE CULTURE/COLONY COUNT: CPT | Mod: TRILAB,WESLAB | Performed by: STUDENT IN AN ORGANIZED HEALTH CARE EDUCATION/TRAINING PROGRAM

## 2024-03-06 PROCEDURE — 81001 URINALYSIS AUTO W/SCOPE: CPT | Performed by: STUDENT IN AN ORGANIZED HEALTH CARE EDUCATION/TRAINING PROGRAM

## 2024-03-06 PROCEDURE — 36415 COLL VENOUS BLD VENIPUNCTURE: CPT | Performed by: STUDENT IN AN ORGANIZED HEALTH CARE EDUCATION/TRAINING PROGRAM

## 2024-03-06 PROCEDURE — 76705 ECHO EXAM OF ABDOMEN: CPT | Performed by: RADIOLOGY

## 2024-03-06 PROCEDURE — 74177 CT ABD & PELVIS W/CONTRAST: CPT

## 2024-03-06 PROCEDURE — 2500000004 HC RX 250 GENERAL PHARMACY W/ HCPCS (ALT 636 FOR OP/ED): Mod: JZ | Performed by: STUDENT IN AN ORGANIZED HEALTH CARE EDUCATION/TRAINING PROGRAM

## 2024-03-06 PROCEDURE — 76705 ECHO EXAM OF ABDOMEN: CPT

## 2024-03-06 PROCEDURE — 2550000001 HC RX 255 CONTRASTS: Performed by: STUDENT IN AN ORGANIZED HEALTH CARE EDUCATION/TRAINING PROGRAM

## 2024-03-06 RX ORDER — MORPHINE SULFATE 2 MG/ML
2 INJECTION, SOLUTION INTRAMUSCULAR; INTRAVENOUS EVERY 4 HOURS PRN
Status: DISCONTINUED | OUTPATIENT
Start: 2024-03-06 | End: 2024-03-07 | Stop reason: HOSPADM

## 2024-03-06 RX ORDER — MORPHINE SULFATE 4 MG/ML
4 INJECTION, SOLUTION INTRAMUSCULAR; INTRAVENOUS ONCE
Status: COMPLETED | OUTPATIENT
Start: 2024-03-06 | End: 2024-03-06

## 2024-03-06 RX ORDER — ACETAMINOPHEN 325 MG/1
650 TABLET ORAL EVERY 4 HOURS PRN
Status: DISCONTINUED | OUTPATIENT
Start: 2024-03-06 | End: 2024-03-07 | Stop reason: HOSPADM

## 2024-03-06 RX ORDER — CIPROFLOXACIN 2 MG/ML
400 INJECTION, SOLUTION INTRAVENOUS ONCE
Status: COMPLETED | OUTPATIENT
Start: 2024-03-06 | End: 2024-03-06

## 2024-03-06 RX ORDER — CIPROFLOXACIN 2 MG/ML
400 INJECTION, SOLUTION INTRAVENOUS EVERY 12 HOURS
Status: DISCONTINUED | OUTPATIENT
Start: 2024-03-07 | End: 2024-03-07 | Stop reason: HOSPADM

## 2024-03-06 RX ORDER — SODIUM CHLORIDE, SODIUM LACTATE, POTASSIUM CHLORIDE, CALCIUM CHLORIDE 600; 310; 30; 20 MG/100ML; MG/100ML; MG/100ML; MG/100ML
75 INJECTION, SOLUTION INTRAVENOUS CONTINUOUS
Status: DISCONTINUED | OUTPATIENT
Start: 2024-03-06 | End: 2024-03-07

## 2024-03-06 RX ORDER — METRONIDAZOLE 500 MG/100ML
500 INJECTION, SOLUTION INTRAVENOUS EVERY 8 HOURS
Status: DISCONTINUED | OUTPATIENT
Start: 2024-03-07 | End: 2024-03-07 | Stop reason: HOSPADM

## 2024-03-06 RX ORDER — ACETAMINOPHEN 650 MG/1
650 SUPPOSITORY RECTAL EVERY 4 HOURS PRN
Status: DISCONTINUED | OUTPATIENT
Start: 2024-03-06 | End: 2024-03-07 | Stop reason: HOSPADM

## 2024-03-06 RX ORDER — ACETAMINOPHEN 160 MG/5ML
650 SOLUTION ORAL EVERY 4 HOURS PRN
Status: DISCONTINUED | OUTPATIENT
Start: 2024-03-06 | End: 2024-03-07 | Stop reason: HOSPADM

## 2024-03-06 RX ORDER — METRONIDAZOLE 500 MG/100ML
500 INJECTION, SOLUTION INTRAVENOUS ONCE
Status: COMPLETED | OUTPATIENT
Start: 2024-03-06 | End: 2024-03-06

## 2024-03-06 RX ORDER — ONDANSETRON HYDROCHLORIDE 2 MG/ML
4 INJECTION, SOLUTION INTRAVENOUS EVERY 6 HOURS PRN
Status: DISCONTINUED | OUTPATIENT
Start: 2024-03-06 | End: 2024-03-07 | Stop reason: HOSPADM

## 2024-03-06 RX ORDER — ONDANSETRON HYDROCHLORIDE 2 MG/ML
4 INJECTION, SOLUTION INTRAVENOUS ONCE
Status: COMPLETED | OUTPATIENT
Start: 2024-03-06 | End: 2024-03-06

## 2024-03-06 RX ADMIN — METRONIDAZOLE 500 MG: 500 INJECTION, SOLUTION INTRAVENOUS at 20:06

## 2024-03-06 RX ADMIN — MORPHINE SULFATE 4 MG: 4 INJECTION, SOLUTION INTRAMUSCULAR; INTRAVENOUS at 20:15

## 2024-03-06 RX ADMIN — MORPHINE SULFATE 2 MG: 2 INJECTION, SOLUTION INTRAMUSCULAR; INTRAVENOUS at 23:47

## 2024-03-06 RX ADMIN — SODIUM CHLORIDE, SODIUM LACTATE, POTASSIUM CHLORIDE, AND CALCIUM CHLORIDE 75 ML/HR: 600; 310; 30; 20 INJECTION, SOLUTION INTRAVENOUS at 23:47

## 2024-03-06 RX ADMIN — CIPROFLOXACIN 400 MG: 400 INJECTION, SOLUTION INTRAVENOUS at 20:11

## 2024-03-06 RX ADMIN — MORPHINE SULFATE 4 MG: 4 INJECTION, SOLUTION INTRAMUSCULAR; INTRAVENOUS at 19:01

## 2024-03-06 RX ADMIN — ONDANSETRON 4 MG: 2 INJECTION INTRAMUSCULAR; INTRAVENOUS at 19:01

## 2024-03-06 RX ADMIN — IOHEXOL 75 ML: 350 INJECTION, SOLUTION INTRAVENOUS at 20:01

## 2024-03-06 RX ADMIN — SODIUM CHLORIDE 1000 ML: 900 INJECTION, SOLUTION INTRAVENOUS at 19:01

## 2024-03-06 ASSESSMENT — COLUMBIA-SUICIDE SEVERITY RATING SCALE - C-SSRS
1. IN THE PAST MONTH, HAVE YOU WISHED YOU WERE DEAD OR WISHED YOU COULD GO TO SLEEP AND NOT WAKE UP?: NO
2. HAVE YOU ACTUALLY HAD ANY THOUGHTS OF KILLING YOURSELF?: NO
6. HAVE YOU EVER DONE ANYTHING, STARTED TO DO ANYTHING, OR PREPARED TO DO ANYTHING TO END YOUR LIFE?: NO

## 2024-03-06 ASSESSMENT — PAIN DESCRIPTION - PAIN TYPE: TYPE: ACUTE PAIN

## 2024-03-06 ASSESSMENT — PAIN - FUNCTIONAL ASSESSMENT
PAIN_FUNCTIONAL_ASSESSMENT: 0-10
PAIN_FUNCTIONAL_ASSESSMENT: 0-10

## 2024-03-06 ASSESSMENT — PAIN DESCRIPTION - LOCATION
LOCATION: ABDOMEN
LOCATION: ABDOMEN

## 2024-03-06 ASSESSMENT — PAIN SCALES - GENERAL
PAINLEVEL_OUTOF10: 6
PAINLEVEL_OUTOF10: 4

## 2024-03-06 ASSESSMENT — PAIN DESCRIPTION - ORIENTATION
ORIENTATION: RIGHT
ORIENTATION: RIGHT;LEFT

## 2024-03-07 ENCOUNTER — ANESTHESIA EVENT (OUTPATIENT)
Dept: OPERATING ROOM | Facility: HOSPITAL | Age: 34
End: 2024-03-07
Payer: COMMERCIAL

## 2024-03-07 ENCOUNTER — PHARMACY VISIT (OUTPATIENT)
Dept: PHARMACY | Facility: CLINIC | Age: 34
End: 2024-03-07
Payer: MEDICARE

## 2024-03-07 ENCOUNTER — ANESTHESIA (OUTPATIENT)
Dept: OPERATING ROOM | Facility: HOSPITAL | Age: 34
End: 2024-03-07
Payer: COMMERCIAL

## 2024-03-07 VITALS
RESPIRATION RATE: 16 BRPM | OXYGEN SATURATION: 98 % | SYSTOLIC BLOOD PRESSURE: 99 MMHG | DIASTOLIC BLOOD PRESSURE: 60 MMHG | WEIGHT: 200.62 LBS | BODY MASS INDEX: 36.92 KG/M2 | TEMPERATURE: 97.9 F | HEIGHT: 62 IN | HEART RATE: 70 BPM

## 2024-03-07 PROBLEM — K35.80 ACUTE APPENDICITIS, UNCOMPLICATED: Status: ACTIVE | Noted: 2024-03-06

## 2024-03-07 LAB
GLUCOSE BLD MANUAL STRIP-MCNC: 119 MG/DL (ref 74–99)
GLUCOSE BLD MANUAL STRIP-MCNC: 166 MG/DL (ref 74–99)

## 2024-03-07 PROCEDURE — G0378 HOSPITAL OBSERVATION PER HR: HCPCS

## 2024-03-07 PROCEDURE — 96366 THER/PROPH/DIAG IV INF ADDON: CPT | Mod: 59

## 2024-03-07 PROCEDURE — 2500000001 HC RX 250 WO HCPCS SELF ADMINISTERED DRUGS (ALT 637 FOR MEDICARE OP): Performed by: SURGERY

## 2024-03-07 PROCEDURE — 3600000004 HC OR TIME - INITIAL BASE CHARGE - PROCEDURE LEVEL FOUR: Performed by: SURGERY

## 2024-03-07 PROCEDURE — 96376 TX/PRO/DX INJ SAME DRUG ADON: CPT | Mod: 59

## 2024-03-07 PROCEDURE — 2500000005 HC RX 250 GENERAL PHARMACY W/O HCPCS: Performed by: ANESTHESIOLOGY

## 2024-03-07 PROCEDURE — 88304 TISSUE EXAM BY PATHOLOGIST: CPT | Mod: TC | Performed by: SURGERY

## 2024-03-07 PROCEDURE — 7100000001 HC RECOVERY ROOM TIME - INITIAL BASE CHARGE: Performed by: SURGERY

## 2024-03-07 PROCEDURE — 82947 ASSAY GLUCOSE BLOOD QUANT: CPT

## 2024-03-07 PROCEDURE — 3700000002 HC GENERAL ANESTHESIA TIME - EACH INCREMENTAL 1 MINUTE: Performed by: SURGERY

## 2024-03-07 PROCEDURE — 2500000004 HC RX 250 GENERAL PHARMACY W/ HCPCS (ALT 636 FOR OP/ED): Performed by: SURGERY

## 2024-03-07 PROCEDURE — 2500000005 HC RX 250 GENERAL PHARMACY W/O HCPCS: Performed by: SURGERY

## 2024-03-07 PROCEDURE — 2500000004 HC RX 250 GENERAL PHARMACY W/ HCPCS (ALT 636 FOR OP/ED): Performed by: ANESTHESIOLOGY

## 2024-03-07 PROCEDURE — 88304 TISSUE EXAM BY PATHOLOGIST: CPT | Performed by: PATHOLOGY

## 2024-03-07 PROCEDURE — 44970 LAPAROSCOPY APPENDECTOMY: CPT | Performed by: PHYSICIAN ASSISTANT

## 2024-03-07 PROCEDURE — RXMED WILLOW AMBULATORY MEDICATION CHARGE

## 2024-03-07 PROCEDURE — 3700000001 HC GENERAL ANESTHESIA TIME - INITIAL BASE CHARGE: Performed by: SURGERY

## 2024-03-07 PROCEDURE — 44970 LAPAROSCOPY APPENDECTOMY: CPT | Performed by: SURGERY

## 2024-03-07 PROCEDURE — 7100000002 HC RECOVERY ROOM TIME - EACH INCREMENTAL 1 MINUTE: Performed by: SURGERY

## 2024-03-07 PROCEDURE — 3600000009 HC OR TIME - EACH INCREMENTAL 1 MINUTE - PROCEDURE LEVEL FOUR: Performed by: SURGERY

## 2024-03-07 PROCEDURE — 2500000004 HC RX 250 GENERAL PHARMACY W/ HCPCS (ALT 636 FOR OP/ED): Mod: JZ | Performed by: SURGERY

## 2024-03-07 PROCEDURE — 99222 1ST HOSP IP/OBS MODERATE 55: CPT | Performed by: SURGERY

## 2024-03-07 PROCEDURE — A4550 SURGICAL TRAYS: HCPCS | Performed by: SURGERY

## 2024-03-07 PROCEDURE — 2720000007 HC OR 272 NO HCPCS: Performed by: SURGERY

## 2024-03-07 RX ORDER — SODIUM CHLORIDE, SODIUM LACTATE, POTASSIUM CHLORIDE, CALCIUM CHLORIDE 600; 310; 30; 20 MG/100ML; MG/100ML; MG/100ML; MG/100ML
100 INJECTION, SOLUTION INTRAVENOUS CONTINUOUS
Status: DISCONTINUED | OUTPATIENT
Start: 2024-03-07 | End: 2024-03-07 | Stop reason: HOSPADM

## 2024-03-07 RX ORDER — FENTANYL CITRATE 50 UG/ML
12.5 INJECTION, SOLUTION INTRAMUSCULAR; INTRAVENOUS EVERY 5 MIN PRN
Status: DISCONTINUED | OUTPATIENT
Start: 2024-03-07 | End: 2024-03-07 | Stop reason: HOSPADM

## 2024-03-07 RX ORDER — ONDANSETRON HYDROCHLORIDE 2 MG/ML
4 INJECTION, SOLUTION INTRAVENOUS ONCE AS NEEDED
Status: DISCONTINUED | OUTPATIENT
Start: 2024-03-07 | End: 2024-03-07 | Stop reason: HOSPADM

## 2024-03-07 RX ORDER — ACETAMINOPHEN 325 MG/1
975 TABLET ORAL ONCE
Status: DISCONTINUED | OUTPATIENT
Start: 2024-03-07 | End: 2024-03-07 | Stop reason: HOSPADM

## 2024-03-07 RX ORDER — GABAPENTIN 300 MG/1
300 CAPSULE ORAL ONCE
Status: COMPLETED | OUTPATIENT
Start: 2024-03-07 | End: 2024-03-07

## 2024-03-07 RX ORDER — SODIUM CHLORIDE, SODIUM LACTATE, POTASSIUM CHLORIDE, CALCIUM CHLORIDE 600; 310; 30; 20 MG/100ML; MG/100ML; MG/100ML; MG/100ML
INJECTION, SOLUTION INTRAVENOUS CONTINUOUS PRN
Status: DISCONTINUED | OUTPATIENT
Start: 2024-03-07 | End: 2024-03-07

## 2024-03-07 RX ORDER — FENTANYL CITRATE 50 UG/ML
INJECTION, SOLUTION INTRAMUSCULAR; INTRAVENOUS AS NEEDED
Status: DISCONTINUED | OUTPATIENT
Start: 2024-03-07 | End: 2024-03-07

## 2024-03-07 RX ORDER — LABETALOL HYDROCHLORIDE 5 MG/ML
5 INJECTION, SOLUTION INTRAVENOUS ONCE AS NEEDED
Status: DISCONTINUED | OUTPATIENT
Start: 2024-03-07 | End: 2024-03-07 | Stop reason: HOSPADM

## 2024-03-07 RX ORDER — ACETAMINOPHEN 500 MG
1000 TABLET ORAL EVERY 6 HOURS PRN
Qty: 20 TABLET | Refills: 0 | Status: SHIPPED | OUTPATIENT
Start: 2024-03-07

## 2024-03-07 RX ORDER — NORETHINDRONE AND ETHINYL ESTRADIOL 0.5-0.035
50 KIT ORAL ONCE
Status: COMPLETED | OUTPATIENT
Start: 2024-03-07 | End: 2024-03-07

## 2024-03-07 RX ORDER — DEXAMETHASONE SODIUM PHOSPHATE 4 MG/ML
INJECTION, SOLUTION INTRA-ARTICULAR; INTRALESIONAL; INTRAMUSCULAR; INTRAVENOUS; SOFT TISSUE AS NEEDED
Status: DISCONTINUED | OUTPATIENT
Start: 2024-03-07 | End: 2024-03-07

## 2024-03-07 RX ORDER — VECURONIUM BROMIDE FOR INJECTION 1 MG/ML
INJECTION, POWDER, LYOPHILIZED, FOR SOLUTION INTRAVENOUS AS NEEDED
Status: DISCONTINUED | OUTPATIENT
Start: 2024-03-07 | End: 2024-03-07

## 2024-03-07 RX ORDER — BUPIVACAINE HYDROCHLORIDE 5 MG/ML
INJECTION, SOLUTION PERINEURAL AS NEEDED
Status: DISCONTINUED | OUTPATIENT
Start: 2024-03-07 | End: 2024-03-07 | Stop reason: HOSPADM

## 2024-03-07 RX ORDER — IBUPROFEN 400 MG/1
400 TABLET ORAL EVERY 8 HOURS PRN
Status: DISCONTINUED | OUTPATIENT
Start: 2024-03-07 | End: 2024-03-07 | Stop reason: HOSPADM

## 2024-03-07 RX ORDER — ALBUTEROL SULFATE 0.83 MG/ML
2.5 SOLUTION RESPIRATORY (INHALATION) ONCE AS NEEDED
Status: DISCONTINUED | OUTPATIENT
Start: 2024-03-07 | End: 2024-03-07 | Stop reason: HOSPADM

## 2024-03-07 RX ORDER — LIDOCAINE HYDROCHLORIDE AND EPINEPHRINE 10; 10 MG/ML; UG/ML
INJECTION, SOLUTION INFILTRATION; PERINEURAL AS NEEDED
Status: DISCONTINUED | OUTPATIENT
Start: 2024-03-07 | End: 2024-03-07 | Stop reason: HOSPADM

## 2024-03-07 RX ORDER — DOCUSATE SODIUM 100 MG/1
100 CAPSULE, LIQUID FILLED ORAL DAILY
Qty: 5 CAPSULE | Refills: 0 | Status: SHIPPED | OUTPATIENT
Start: 2024-03-07

## 2024-03-07 RX ORDER — ACETAMINOPHEN 325 MG/1
650 TABLET ORAL EVERY 4 HOURS PRN
Status: DISCONTINUED | OUTPATIENT
Start: 2024-03-07 | End: 2024-03-07

## 2024-03-07 RX ORDER — HYDRALAZINE HYDROCHLORIDE 20 MG/ML
5 INJECTION INTRAMUSCULAR; INTRAVENOUS EVERY 30 MIN PRN
Status: DISCONTINUED | OUTPATIENT
Start: 2024-03-07 | End: 2024-03-07 | Stop reason: HOSPADM

## 2024-03-07 RX ORDER — NEOSTIGMINE METHYLSULFATE 1 MG/ML
INJECTION, SOLUTION INTRAVENOUS AS NEEDED
Status: DISCONTINUED | OUTPATIENT
Start: 2024-03-07 | End: 2024-03-07

## 2024-03-07 RX ORDER — MEPERIDINE HYDROCHLORIDE 25 MG/ML
12.5 INJECTION INTRAMUSCULAR; INTRAVENOUS; SUBCUTANEOUS EVERY 10 MIN PRN
Status: DISCONTINUED | OUTPATIENT
Start: 2024-03-07 | End: 2024-03-07 | Stop reason: HOSPADM

## 2024-03-07 RX ORDER — OXYCODONE HYDROCHLORIDE 5 MG/1
5 TABLET ORAL EVERY 6 HOURS PRN
Qty: 15 TABLET | Refills: 0 | Status: SHIPPED | OUTPATIENT
Start: 2024-03-07

## 2024-03-07 RX ORDER — LIDOCAINE HYDROCHLORIDE 20 MG/ML
INJECTION, SOLUTION INFILTRATION; PERINEURAL AS NEEDED
Status: DISCONTINUED | OUTPATIENT
Start: 2024-03-07 | End: 2024-03-07

## 2024-03-07 RX ORDER — MIDAZOLAM HYDROCHLORIDE 1 MG/ML
INJECTION, SOLUTION INTRAMUSCULAR; INTRAVENOUS AS NEEDED
Status: DISCONTINUED | OUTPATIENT
Start: 2024-03-07 | End: 2024-03-07

## 2024-03-07 RX ORDER — FENTANYL CITRATE 50 UG/ML
50 INJECTION, SOLUTION INTRAMUSCULAR; INTRAVENOUS EVERY 5 MIN PRN
Status: DISCONTINUED | OUTPATIENT
Start: 2024-03-07 | End: 2024-03-07 | Stop reason: HOSPADM

## 2024-03-07 RX ORDER — PROPOFOL 10 MG/ML
INJECTION, EMULSION INTRAVENOUS AS NEEDED
Status: DISCONTINUED | OUTPATIENT
Start: 2024-03-07 | End: 2024-03-07

## 2024-03-07 RX ORDER — GLYCOPYRROLATE 0.2 MG/ML
INJECTION INTRAMUSCULAR; INTRAVENOUS AS NEEDED
Status: DISCONTINUED | OUTPATIENT
Start: 2024-03-07 | End: 2024-03-07

## 2024-03-07 RX ADMIN — CIPROFLOXACIN 400 MG: 2 INJECTION, SOLUTION INTRAVENOUS at 08:26

## 2024-03-07 RX ADMIN — FENTANYL CITRATE 50 MCG: 0.05 INJECTION, SOLUTION INTRAMUSCULAR; INTRAVENOUS at 10:48

## 2024-03-07 RX ADMIN — ONDANSETRON 4 MG: 2 INJECTION INTRAMUSCULAR; INTRAVENOUS at 03:32

## 2024-03-07 RX ADMIN — PROPOFOL 170 MG: 10 INJECTION, EMULSION INTRAVENOUS at 09:45

## 2024-03-07 RX ADMIN — NEOSTIGMINE METHYLSULFATE 3.5 MG: 1 INJECTION, SOLUTION INTRAVENOUS at 10:41

## 2024-03-07 RX ADMIN — METRONIDAZOLE 500 MG: 500 INJECTION, SOLUTION INTRAVENOUS at 03:32

## 2024-03-07 RX ADMIN — MIDAZOLAM HYDROCHLORIDE 2 MG: 1 INJECTION, SOLUTION INTRAMUSCULAR; INTRAVENOUS at 09:44

## 2024-03-07 RX ADMIN — VECURONIUM BROMIDE 7 MG: 10 INJECTION, POWDER, FOR SOLUTION INTRAVENOUS at 09:45

## 2024-03-07 RX ADMIN — DEXAMETHASONE SODIUM PHOSPHATE 4 MG: 4 INJECTION, SOLUTION INTRA-ARTICULAR; INTRALESIONAL; INTRAMUSCULAR; INTRAVENOUS; SOFT TISSUE at 10:19

## 2024-03-07 RX ADMIN — MORPHINE SULFATE 2 MG: 2 INJECTION, SOLUTION INTRAMUSCULAR; INTRAVENOUS at 03:32

## 2024-03-07 RX ADMIN — ACETAMINOPHEN 650 MG: 325 TABLET ORAL at 09:36

## 2024-03-07 RX ADMIN — LIDOCAINE HYDROCHLORIDE 50 MG: 20 INJECTION, SOLUTION INFILTRATION; PERINEURAL at 09:45

## 2024-03-07 RX ADMIN — HYDROMORPHONE HYDROCHLORIDE 0.5 MG: 1 INJECTION, SOLUTION INTRAMUSCULAR; INTRAVENOUS; SUBCUTANEOUS at 11:31

## 2024-03-07 RX ADMIN — EPHEDRINE SULFATE 50 MG: 50 INJECTION, SOLUTION INTRAVENOUS at 11:36

## 2024-03-07 RX ADMIN — FENTANYL CITRATE 50 MCG: 0.05 INJECTION, SOLUTION INTRAMUSCULAR; INTRAVENOUS at 10:13

## 2024-03-07 RX ADMIN — GABAPENTIN 300 MG: 300 CAPSULE ORAL at 09:36

## 2024-03-07 RX ADMIN — SODIUM CHLORIDE, POTASSIUM CHLORIDE, SODIUM LACTATE AND CALCIUM CHLORIDE: 600; 310; 30; 20 INJECTION, SOLUTION INTRAVENOUS at 09:37

## 2024-03-07 RX ADMIN — PROPOFOL 20 MG: 10 INJECTION, EMULSION INTRAVENOUS at 10:36

## 2024-03-07 RX ADMIN — FENTANYL CITRATE 50 MCG: 0.05 INJECTION, SOLUTION INTRAMUSCULAR; INTRAVENOUS at 10:44

## 2024-03-07 RX ADMIN — FENTANYL CITRATE 50 MCG: 0.05 INJECTION, SOLUTION INTRAMUSCULAR; INTRAVENOUS at 09:45

## 2024-03-07 RX ADMIN — PROPOFOL 10 MG: 10 INJECTION, EMULSION INTRAVENOUS at 10:40

## 2024-03-07 RX ADMIN — CIPROFLOXACIN: 2 INJECTION, SOLUTION INTRAVENOUS at 09:53

## 2024-03-07 RX ADMIN — GLYCOPYRROLATE 0.5 MG: 0.2 INJECTION INTRAMUSCULAR; INTRAVENOUS at 10:41

## 2024-03-07 SDOH — SOCIAL STABILITY: SOCIAL INSECURITY: DOES ANYONE TRY TO KEEP YOU FROM HAVING/CONTACTING OTHER FRIENDS OR DOING THINGS OUTSIDE YOUR HOME?: NO

## 2024-03-07 SDOH — SOCIAL STABILITY: SOCIAL INSECURITY: ARE THERE ANY APPARENT SIGNS OF INJURIES/BEHAVIORS THAT COULD BE RELATED TO ABUSE/NEGLECT?: NO

## 2024-03-07 SDOH — SOCIAL STABILITY: SOCIAL INSECURITY: WERE YOU ABLE TO COMPLETE ALL THE BEHAVIORAL HEALTH SCREENINGS?: YES

## 2024-03-07 SDOH — SOCIAL STABILITY: SOCIAL INSECURITY: HAS ANYONE EVER THREATENED TO HURT YOUR FAMILY OR YOUR PETS?: NO

## 2024-03-07 SDOH — HEALTH STABILITY: MENTAL HEALTH: CURRENT SMOKER: 0

## 2024-03-07 SDOH — SOCIAL STABILITY: SOCIAL INSECURITY: DO YOU FEEL ANYONE HAS EXPLOITED OR TAKEN ADVANTAGE OF YOU FINANCIALLY OR OF YOUR PERSONAL PROPERTY?: NO

## 2024-03-07 SDOH — SOCIAL STABILITY: SOCIAL INSECURITY: ABUSE: ADULT

## 2024-03-07 SDOH — SOCIAL STABILITY: SOCIAL INSECURITY: ARE YOU OR HAVE YOU BEEN THREATENED OR ABUSED PHYSICALLY, EMOTIONALLY, OR SEXUALLY BY ANYONE?: NO

## 2024-03-07 SDOH — SOCIAL STABILITY: SOCIAL INSECURITY: HAVE YOU HAD THOUGHTS OF HARMING ANYONE ELSE?: NO

## 2024-03-07 SDOH — SOCIAL STABILITY: SOCIAL INSECURITY: DO YOU FEEL UNSAFE GOING BACK TO THE PLACE WHERE YOU ARE LIVING?: NO

## 2024-03-07 ASSESSMENT — ENCOUNTER SYMPTOMS
PSYCHIATRIC NEGATIVE: 1
HEMATOLOGIC/LYMPHATIC NEGATIVE: 1
ABDOMINAL PAIN: 1
EYES NEGATIVE: 1
CARDIOVASCULAR NEGATIVE: 1
NEUROLOGICAL NEGATIVE: 1
ENDOCRINE NEGATIVE: 1
MUSCULOSKELETAL NEGATIVE: 1
RESPIRATORY NEGATIVE: 1
CONSTITUTIONAL NEGATIVE: 1

## 2024-03-07 ASSESSMENT — PAIN - FUNCTIONAL ASSESSMENT
PAIN_FUNCTIONAL_ASSESSMENT: 0-10

## 2024-03-07 ASSESSMENT — COGNITIVE AND FUNCTIONAL STATUS - GENERAL
PATIENT BASELINE BEDBOUND: NO
MOBILITY SCORE: 24
MOBILITY SCORE: 24
DAILY ACTIVITIY SCORE: 24
DAILY ACTIVITIY SCORE: 24

## 2024-03-07 ASSESSMENT — ACTIVITIES OF DAILY LIVING (ADL)
HEARING - RIGHT EAR: FUNCTIONAL
PATIENT'S MEMORY ADEQUATE TO SAFELY COMPLETE DAILY ACTIVITIES?: YES
WALKS IN HOME: INDEPENDENT
ADEQUATE_TO_COMPLETE_ADL: YES
DRESSING YOURSELF: INDEPENDENT
BATHING: INDEPENDENT
LACK_OF_TRANSPORTATION: NO
HEARING - LEFT EAR: FUNCTIONAL
GROOMING: INDEPENDENT
TOILETING: INDEPENDENT
FEEDING YOURSELF: INDEPENDENT
JUDGMENT_ADEQUATE_SAFELY_COMPLETE_DAILY_ACTIVITIES: YES

## 2024-03-07 ASSESSMENT — PAIN SCALES - GENERAL
PAINLEVEL_OUTOF10: 5 - MODERATE PAIN
PAINLEVEL_OUTOF10: 4
PAINLEVEL_OUTOF10: 3
PAIN_LEVEL: 6
PAINLEVEL_OUTOF10: 7
PAINLEVEL_OUTOF10: 6
PAINLEVEL_OUTOF10: 6
PAINLEVEL_OUTOF10: 3

## 2024-03-07 ASSESSMENT — PATIENT HEALTH QUESTIONNAIRE - PHQ9
SUM OF ALL RESPONSES TO PHQ9 QUESTIONS 1 & 2: 0
1. LITTLE INTEREST OR PLEASURE IN DOING THINGS: NOT AT ALL
2. FEELING DOWN, DEPRESSED OR HOPELESS: NOT AT ALL

## 2024-03-07 ASSESSMENT — PAIN DESCRIPTION - LOCATION
LOCATION: ABDOMEN

## 2024-03-07 ASSESSMENT — LIFESTYLE VARIABLES
HOW MANY STANDARD DRINKS CONTAINING ALCOHOL DO YOU HAVE ON A TYPICAL DAY: 1 OR 2
SKIP TO QUESTIONS 9-10: 1
AUDIT-C TOTAL SCORE: 2
AUDIT-C TOTAL SCORE: 2
HOW OFTEN DO YOU HAVE 6 OR MORE DRINKS ON ONE OCCASION: NEVER
HOW OFTEN DO YOU HAVE A DRINK CONTAINING ALCOHOL: 2-4 TIMES A MONTH

## 2024-03-07 ASSESSMENT — CHA2DS2 SCORE
AGE IN YEARS: <65
SEX: FEMALE

## 2024-03-07 NOTE — DISCHARGE INSTRUCTIONS
May shower, glue will peel off on it's own    Apply Band-aids to wounds if you have drainage    May apply ice to the wound for 15 minutes at a time, this can be done for the first 2 days after surgery    No lifting greater than 15 pounds for 4 weeks    Eat cracker, soup, light sandwich the night of surgery. May eat regular food the day after surgery.     May take stool softeners (colace daily for 10 days). If you still do not poop after 10 days take miralax or milk of magnesia until you poop.     Take tylenol and ibuprofen with oxycodone as needed for pain    Call the clinic Monday to make an appointment in 2 weeks

## 2024-03-07 NOTE — CARE PLAN
Problem: Pain  Goal: Takes deep breaths with improved pain control throughout the shift  Outcome: Progressing  Goal: Turns in bed with improved pain control throughout the shift  Outcome: Progressing  Goal: Walks with improved pain control throughout the shift  Outcome: Progressing  Goal: Performs ADL's with improved pain control throughout shift  Outcome: Progressing  Goal: Participates in PT with improved pain control throughout the shift  Outcome: Progressing  Goal: Free from opioid side effects throughout the shift  Outcome: Progressing  Goal: Free from acute confusion related to pain meds throughout the shift  Outcome: Progressing   The patient's goals for the shift include manage pain    The clinical goals for the shift include manage pain

## 2024-03-07 NOTE — ANESTHESIA PROCEDURE NOTES
Airway  Date/Time: 3/7/2024 9:46 AM  Urgency: elective    Airway not difficult    Staffing  Performed: attending   Authorized by: Davis Gomez MD    Performed by: Davis Gomez MD  Patient location during procedure: OR    Indications and Patient Condition  Indications for airway management: anesthesia  Sedation level: deep  Preoxygenated: yes  Patient position: sniffing  Mask difficulty assessment: 1 - vent by mask    Final Airway Details  Final airway type: endotracheal airway      Successful airway: ETT  Cuffed: yes   Successful intubation technique: direct laryngoscopy  Facilitating devices/methods: intubating stylet  Endotracheal tube insertion site: oral  Blade: Jose Alberto  Blade size: #3  ETT size (mm): 7.0  Cormack-Lehane Classification: grade I - full view of glottis  Placement verified by: chest auscultation and capnometry   Measured from: lips  ETT to lips (cm): 21  Number of attempts at approach: 1

## 2024-03-07 NOTE — ED PROVIDER NOTES
HPI   Chief Complaint   Patient presents with    Abdominal Pain     RUQ and RLQ pain for 3 days with nausea, denies chest pain, vomiting or diarrhea. Patient does take ozempic, regular periods and regular BM        This is a 33-year-old female with a past medical history of depression, type 2 diabetes on Ozempic, MEN-1 presenting to the ED for evaluation of right-sided abdominal pain.  She states that her pain affects the right upper and lower quadrants.  It has been present for the past 2-3 days and has been gradually worsening over time.  She states it is more severe in the right upper quadrant.  She has nausea but no vomiting, no diarrhea, no bloody stools.  She denies any vaginal discharge or bleeding, she has an IUD in place and is not sexually active, does not think she could be pregnant.  She was able to eat something around 10 AM today but states that due to the pain she has not been hungry all day otherwise.  She denies any fevers or chills.  She has no chest pain or shortness of breath.      History provided by:  Patient   used: No                        Endy Coma Scale Score: 15                     Patient History   Past Medical History:   Diagnosis Date    Diabetes (CMS/HCC)     Hypothyroidism     MEN1 (multiple endocrine neoplasia) (CMS/HCC)     Migraines      Past Surgical History:   Procedure Laterality Date    PARATHYROIDECTOMY  10/2019    and nodule removed     Family History   Problem Relation Name Age of Onset    Other (MEN 1) Mother          requiring parathyroid removal. Sees     Hypertension Father      No Known Problems Brother      No Known Problems Brother      No Known Problems Daughter      Other (MEN 1) Mother's Brother          requiring parathyroid removal    Other (MEN 1) Maternal Grandmother          requiring parathyroid removal    Breast cancer Maternal Grandmother       Social History     Tobacco Use    Smoking status: Former     Types: Cigarettes      Quit date:      Years since quittin.1    Smokeless tobacco: Never   Vaping Use    Vaping Use: Never used   Substance Use Topics    Alcohol use: Yes     Comment: Socially. 2 glasses of wine each week    Drug use: Never       Physical Exam   ED Triage Vitals [24 1819]   Temperature Heart Rate Respirations BP   36.7 °C (98.1 °F) 77 16 138/83      Pulse Ox Temp Source Heart Rate Source Patient Position   98 % Temporal Monitor --      BP Location FiO2 (%)     -- --       Physical Exam  General: well developed, obese adult female who is awake and alert, uncomfortable appearing but in no acute distress  Eyes: sclera clear bilaterally  HENT: normocephalic, atraumatic.   CV: regular rate and rhythm, no murmur, no gallops, or rubs.   Resp: clear to ascultation bilaterally, no wheezes, rales, or rhonchi  GI: abdomen soft, tender palpation in the right upper and lower quadrants, Houser sign positive.  Rovsing sign is positive as well.  Tenderness is worst in the right upper quadrant.  Abdomen is soft without rigidity, is nondistended, no masses palpated  Psych: appropriate mood and affect, cooperative with exam  Skin: warm, dry, without evidence of rash or abrasions    ED Course & MDM   ED Course as of 03/06/24 2347   Wed Mar 06, 2024   2335 Patient endorsed to me by the previous physician awaiting final disposition.  The patient had contacted Dr. Wilkins's nurse practitioner who contacted Dr. Goodson who stated that she was willing to accept the patient to her service.  I contacted Dr. Wilkins to confirm this.  The patient was admitted to Dr. Wilkins. [KW]      ED Course User Index  [KW] Sergei Lee DO         Diagnoses as of 24   Acute appendicitis, uncomplicated       Medical Decision Making  PMH: Depression, type 2 diabetes, MEN-1  History obtained: directly from patient   Social factors affecting disposition: none    She is uncomfortable appearing but in no acute distress here.  She was given  morphine, IV fluids, IV Zofran for symptomatic relief.  I am concerned for acute cholecystitis or acute appendicitis given the location of her pain and her tenderness on abdominal exam.  She was sent for CT imaging.  She does have a white count of 17.3, laboratory workup is otherwise unremarkable.  She has no urinary symptoms, she does have a few white blood cells in the urine but no bacteria, no UTI.    She does have a penicillin allergy.  I ordered Cipro and Flagyl to be given for coverage of suspected intra-abdominal infection pending CT and ultrasound results.    Ultrasound reveals normal gallbladder, CT shows acute uncomplicated appendicitis.  Transfer center order placed to transfer the patient for appendectomy, there is no general surgeon on-call at this hospital currently.    Procedure  Procedures     Sergei Lee, DO  03/06/24 1217       Yeison Franklin, DO  03/07/24 2027

## 2024-03-07 NOTE — PROGRESS NOTES
03/07/24 1131   Discharge Planning   Patient expects to be discharged to: Home   Does the patient need discharge transport arranged? No         Likely, home no needs when discharged.  Patient in surgery at this time.

## 2024-03-07 NOTE — OP NOTE
Appendectomy Laparoscopy Operative Note     Date: 3/6/2024 - 3/7/2024  OR Location: [facility]    Name: Tracy Villalobos, : 1990, Age: 33 y.o., MRN: [id number], Sex: female    Diagnosis  Pre-op Diagnosis     * Acute appendicitis, uncomplicated [K35.80] Post-op Diagnosis     * Acute appendicitis, uncomplicated [K35.80]     Procedures  Appendectomy Laparoscopy  30468 - CT LAPAROSCOPIC APPENDECTOMY      Surgeons      * Lacey Wilkins - Primary     * Marcial Goode    Resident/Fellow/Other Assistant:  Surgeon(s) and Role:     * Marcial Goode PA-C    Procedure Summary  Anesthesia: General  ASA: II  Anesthesia Staff: Anesthesiologist: Davis Gomez MD  Estimated Blood Loss: 10 mL  Intra-op Medications: Administrations occurring from 1245 to 1415 on 24:  * No intraprocedure medications in log *      Intraprocedure I/O Totals       None           Specimen:   ID Type Source Tests Collected by Time   1 : appendix Tissue APPENDIX SURGICAL PATHOLOGY EXAM Lacey Wilkins MD 3/7/2024 1021        Staff:   Circulator: Alvin Gonzalez RN  Scrub Person: Boni Bucio RN         Drains and/or Catheters: * None in log *    Tourniquet Times: n/a        Implants: n/a    Findings: acute appendicitis    Indications: Tracy Villalobos is an 33 y.o. female who is having surgery for Acute appendicitis, uncomplicated [K35.80].     The patient was seen in the preoperative area. The risks, benefits, complications, treatment options, non-operative alternatives, expected recovery and outcomes were discussed with the patient. The possibilities of reaction to medication, pulmonary aspiration, injury to surrounding structures, bleeding, recurrent infection, the need for additional procedures, failure to diagnose a condition, and creating a complication requiring transfusion or operation were discussed with the patient. The patient concurred with the proposed plan, giving informed consent.  The site of surgery was properly  noted/marked if necessary per policy. The patient has been actively warmed in preoperative area. Preoperative antibiotics have been ordered and given within 1 hours of incision. Venous thrombosis prophylaxis have been ordered including bilateral sequential compression devices    Procedure Details:   The patient was seen again in the Holding Room. The risks, benefits, complications, treatment options, and expected outcomes were discussed with the patient and family. There was concurrence with the proposed plan and informed consent was obtained. The site of surgery was properly noted. The patient was taken to Operating Room, identified as Tracy Villalobos and the procedure verified as Appendectomy. A Time Out was held and the above information confirmed.    Patient placed in supine position. A chau was placed. The abdomen is prepped draped usual fashion. A total of 20 mL 1% lidocaine with epinephrine was injected into the intended trocar sites.  An 11 blade scalpel was used to make a vertical incision below the umbilicus. A Kocher was used to grasp the umbilical stalk and elevated the abdominal wall. An 11 scalpel was used to make a nick in the midline. A skinny Bev was used to bluntly dissect into the midline. 12 millimeter trocar was placed at this location. Pneumoperitoneum was achieved.   5 millimeter trocars were placed in the left lateral quadrant and suprapubic region under optical visualization.  The abdomen was inspected with the only abnormality being the appendix which was a enlarged.     The appendix was grasped retracted.  A Harmonic was used to dissect the mesoappendix.  45 millimeter white load staples fired across the mesoappendix.  A 45 millimeter blue load was fired across the junction of the appendix at the cecum.   A second load was used to finish removal of the appendix.  Adequate hemostasis was achieved at the end of the case.  The appendix was retrieved in Endo-Catch bag through the umbilical  port.  The umbilical port was closed with an 0 Vicryl suture.  Skin was closed with 4 Monocryl. Dressings were applied.  Patient tolerated the procedure well was extubated transported to PACU in stable condition for to space same day discharge.    Instrument, sponge, and needle counts were correct at the conclusion of the case.   Complications:  None; patient tolerated the procedure well.    Disposition: PACU - hemodynamically stable.  Condition: stable         Additional Details: appendix sent as specimen    Attending Attestation: I performed the procedure.    Lacey Wilkins  Phone Number: [phone]

## 2024-03-07 NOTE — ANESTHESIA PREPROCEDURE EVALUATION
Patient: Tracy Villalobos    Procedure Information       Date/Time: 03/07/24 1245    Procedure: Appendectomy Laparoscopy (Abdomen)    Location: [facility] / [facility]    Surgeons: Lacey Wilkins MD            Relevant Problems   Endocrine   (+) Hypothyroidism   (+) Morbid (severe) obesity due to excess calories (CMS/HCC)   (+) Primary hyperparathyroidism (CMS/HCC)     Past Surgical History:   Procedure Laterality Date    PARATHYROIDECTOMY  10/2019    and nodule removed       Clinical information reviewed:   Tobacco  Allergies  Meds   Med Hx  Surg Hx   Fam Hx  Soc Hx        NPO Detail:  No data recorded     Physical Exam    Airway  Mallampati: II  TM distance: >3 FB  Neck ROM: full     Cardiovascular   Comments: deferred   Dental    Pulmonary   Comments: deferred   Abdominal     Comments: deferred           Anesthesia Plan    History of general anesthesia?: yes  History of complications of general anesthesia?: no    ASA 2 - emergent     general   (ETT)  The patient is not a current smoker. (Quit cigarettes in 2013)    intravenous induction   Postoperative administration of opioids is intended.  Anesthetic plan and risks discussed with patient.

## 2024-03-07 NOTE — ANESTHESIA POSTPROCEDURE EVALUATION
Patient: Tracy Villalobos    Procedure Summary       Date: 03/07/24 Room / Location: [facility]    Anesthesia Start: 0938 Anesthesia Stop: 1055    Procedure: Appendectomy Laparoscopy (Abdomen) Diagnosis:       Acute appendicitis, uncomplicated      (Acute appendicitis, uncomplicated [K35.80])    Surgeons: Lacey Wilkins MD Responsible Provider: Davis Gomez MD    Anesthesia Type: general ASA Status: 2 - Emergent            Anesthesia Type: general    Vitals Value Taken Time   /56 03/07/24 1101   Temp 36.2 03/07/24 1103   Pulse 51 03/07/24 1103   Resp 13 03/07/24 1103   SpO2 96 % 03/07/24 1103   Vitals shown include unvalidated device data.    Anesthesia Post Evaluation    Patient location during evaluation: PACU  Patient participation: complete - patient participated  Level of consciousness: awake and alert  Pain score: 6  Pain management: satisfactory to patient  Airway patency: patent  Cardiovascular status: hemodynamically stable  Respiratory status: acceptable  Hydration status: acceptable  Postoperative Nausea and Vomiting: mild  Comments: PONV mild, treated.         There were no known notable events for this encounter.

## 2024-03-07 NOTE — DISCHARGE SUMMARY
Discharge Diagnosis  Appendicitis    Issues Requiring Follow-Up  Surgical incisions    Test Results Pending At Discharge  Pending Labs       Order Current Status    Extra Urine Gray Tube Collected (03/06/24 5869)    Surgical Pathology Exam Collected (03/07/24 1021)    Urinalysis with Reflex Culture and Microscopic In process    Urine Culture In process            Hospital Course   Admitted for observation for acute appendicitis. Antibiotics given and IVFs given. Patient underwent laparoscopic appendectomy cleared for discharge home.      Pertinent Physical Exam At Time of Discharge  Physical Exam  Constitutional:       Appearance: Normal appearance.   HENT:      Head: Normocephalic.      Nose: Nose normal.      Mouth/Throat:      Mouth: Mucous membranes are dry.   Eyes:      Extraocular Movements: Extraocular movements intact.      Pupils: Pupils are equal, round, and reactive to light.   Cardiovascular:      Rate and Rhythm: Normal rate.      Pulses: Normal pulses.   Pulmonary:      Effort: Pulmonary effort is normal.   Abdominal:      General: Abdomen is flat.      Palpations: Abdomen is soft.      Tenderness: There is abdominal tenderness.   Musculoskeletal:         General: Normal range of motion.      Cervical back: Normal range of motion.   Skin:     General: Skin is warm and dry.   Neurological:      General: No focal deficit present.      Mental Status: She is alert and oriented to person, place, and time.   Psychiatric:         Mood and Affect: Mood normal.         Behavior: Behavior normal.         Home Medications     Medication List      START taking these medications     acetaminophen 500 mg tablet; Commonly known as: Tylenol; Take 2 tablets   (1,000 mg) by mouth every 6 hours if needed for moderate pain (4 - 6).   docusate sodium 100 mg capsule; Commonly known as: Colace; Take 1   capsule (100 mg) by mouth once daily.   oxyCODONE 5 mg immediate release tablet; Commonly known as: Roxicodone;   Take 1  tablet (5 mg) by mouth every 6 hours if needed for severe pain (7 -   10).     CHANGE how you take these medications     Ozempic 0.25 mg or 0.5 mg(2 mg/1.5 mL) pen injector; Generic drug:   semaglutide; Inject 0.25 mg under the skin 1 (one) time per week.; What   changed: additional instructions     CONTINUE taking these medications     CALCIUM CARBONATE-VIT D3-MIN ORAL   levonorgestrel 21 mcg/24 hours (8 yrs) 52 mg IUD; Commonly known as:   Mirena   levothyroxine 112 mcg tablet; Commonly known as: Unithroid; Take 1   tablet (112 mcg) by mouth once daily in the morning. Take before meals.   rizatriptan 10 mg tablet; Commonly known as: Maxalt; Take 1 tablet (10   mg) by mouth 1 time if needed for migraine. May repeat in 2 hours if   unresolved. Do not exceed 30 mg in 24 hours.   topiramate 50 mg tablet; Commonly known as: Topamax; Take 1 tablet (50   mg) by mouth 2 times a day.     STOP taking these medications     ibuprofen 200 mg tablet       Outpatient Follow-Up  Future Appointments   Date Time Provider Department Center   3/22/2024  1:00 PM Michelle Vallejo MD [facility]       Lacey Wilkins MD

## 2024-03-07 NOTE — H&P
General Surgery Service    History Of Present Illness    Tracy Villalobos is a 33 y.o. female presenting with 3 days of abdominal pain. Became more severe yesterday while running. It began on the right upper side and localized to the right side.     Work up in the ER revealed acute appendicitis on CT scan and WBC of 17.3.    No previous abdominal surgeries.     Past Medical History  She has a past medical history of Diabetes (CMS/[facility]), Hypothyroidism, MEN1 (multiple endocrine neoplasia) (CMS/[facility]), and Migraines.    No current facility-administered medications on file prior to encounter.     Current Outpatient Medications on File Prior to Encounter   Medication Sig Dispense Refill    calcium carb/vit D3/minerals (CALCIUM CARBONATE-VIT D3-MIN ORAL) Take 2 tablets by mouth 2 times a day.      ibuprofen 200 mg tablet Take 1 tablet (200 mg) by mouth 3 times a day as needed.      levonorgestrel (Mirena) 21 mcg/24 hours (8 yrs) 52 mg IUD 52 mg by intrauterine route 1 time.      levothyroxine (Unithroid) 112 mcg tablet Take 1 tablet (112 mcg) by mouth once daily in the morning. Take before meals. 90 tablet 0    rizatriptan (Maxalt) 10 mg tablet Take 1 tablet (10 mg) by mouth 1 time if needed for migraine. May repeat in 2 hours if unresolved. Do not exceed 30 mg in 24 hours. 9 tablet 3    semaglutide (Ozempic) 0.25 mg or 0.5 mg(2 mg/1.5 mL) pen injector Inject 0.25 mg under the skin 1 (one) time per week. (Patient taking differently: Inject 0.25 mg under the skin 1 (one) time per week. Every Monday) 1 each 0    topiramate (Topamax) 50 mg tablet Take 1 tablet (50 mg) by mouth 2 times a day. 60 tablet 6         Surgical History  She has a past surgical history that includes Parathyroidectomy (10/2019).     Social History  She reports that she quit smoking about 11 years ago. Her smoking use included cigarettes. She has never used smokeless tobacco. She reports current alcohol use. She reports that she does not use  drugs.    Family History  Family History   Problem Relation Name Age of Onset    Other (MEN 1) Mother          requiring parathyroid removal. Sees     Hypertension Father      No Known Problems Brother      No Known Problems Brother      No Known Problems Daughter      Other (MEN 1) Mother's Brother          requiring parathyroid removal    Other (MEN 1) Maternal Grandmother          requiring parathyroid removal    Breast cancer Maternal Grandmother          Allergies  Penicillins    Review of Systems   Constitutional: Negative.    HENT: Negative.     Eyes: Negative.    Respiratory: Negative.     Cardiovascular: Negative.    Gastrointestinal:  Positive for abdominal pain.   Endocrine: Negative.    Genitourinary: Negative.    Musculoskeletal: Negative.    Skin: Negative.    Neurological: Negative.    Hematological: Negative.    Psychiatric/Behavioral: Negative.          Physical Exam  Constitutional:       Appearance: Normal appearance.   HENT:      Head: Normocephalic.      Nose: Nose normal.      Mouth/Throat:      Mouth: Mucous membranes are dry.   Eyes:      Extraocular Movements: Extraocular movements intact.      Pupils: Pupils are equal, round, and reactive to light.   Cardiovascular:      Rate and Rhythm: Normal rate.      Pulses: Normal pulses.   Pulmonary:      Effort: Pulmonary effort is normal.   Abdominal:      Tenderness: There is abdominal tenderness.      Comments: Abdominal pain on the right side   Musculoskeletal:         General: Normal range of motion.      Cervical back: Normal range of motion.   Skin:     General: Skin is warm and dry.   Neurological:      Mental Status: She is alert and oriented to person, place, and time.   Psychiatric:         Mood and Affect: Mood normal.         Behavior: Behavior normal.          Last Recorded Vitals  /81   Pulse 77   Temp 36.2 °C (97.2 °F) (Temporal)   Resp 13   Wt 91 kg (200 lb 9.9 oz)   SpO2 100%     Relevant Results    Results for  orders placed or performed during the hospital encounter of 03/06/24 (from the past 24 hour(s))   CBC and Auto Differential   Result Value Ref Range    WBC 17.3 (H) 4.4 - 11.3 x10*3/uL    nRBC 0.0 0.0 - 0.0 /100 WBCs    RBC 4.24 4.00 - 5.20 x10*6/uL    Hemoglobin 12.7 12.0 - 16.0 g/dL    Hematocrit 37.7 36.0 - 46.0 %    MCV 89 80 - 100 fL    MCH 30.0 26.0 - 34.0 pg    MCHC 33.7 32.0 - 36.0 g/dL    RDW 11.9 11.5 - 14.5 %    Platelets 385 150 - 450 x10*3/uL    Neutrophils % 81.2 40.0 - 80.0 %    Immature Granulocytes %, Automated 0.4 0.0 - 0.9 %    Lymphocytes % 10.0 13.0 - 44.0 %    Monocytes % 7.4 2.0 - 10.0 %    Eosinophils % 0.7 0.0 - 6.0 %    Basophils % 0.3 0.0 - 2.0 %    Neutrophils Absolute 14.05 (H) 1.20 - 7.70 x10*3/uL    Immature Granulocytes Absolute, Automated 0.07 0.00 - 0.70 x10*3/uL    Lymphocytes Absolute 1.73 1.20 - 4.80 x10*3/uL    Monocytes Absolute 1.28 (H) 0.10 - 1.00 x10*3/uL    Eosinophils Absolute 0.12 0.00 - 0.70 x10*3/uL    Basophils Absolute 0.05 0.00 - 0.10 x10*3/uL   Comprehensive metabolic panel   Result Value Ref Range    Glucose 123 (H) 65 - 99 mg/dL    Sodium 135 133 - 145 mmol/L    Potassium 3.6 3.4 - 5.1 mmol/L    Chloride 100 97 - 107 mmol/L    Bicarbonate 21 (L) 24 - 31 mmol/L    Urea Nitrogen 10 8 - 25 mg/dL    Creatinine 0.70 0.40 - 1.60 mg/dL    eGFR >90 >60 mL/min/1.73m*2    Calcium 10.2 8.5 - 10.4 mg/dL    Albumin 4.2 3.5 - 5.0 g/dL    Alkaline Phosphatase 95 35 - 125 U/L    Total Protein 8.2 (H) 5.9 - 7.9 g/dL    AST 16 5 - 40 U/L    Bilirubin, Total 0.6 0.1 - 1.2 mg/dL    ALT 20 5 - 40 U/L    Anion Gap 14 <=19 mmol/L   Lipase   Result Value Ref Range    Lipase 18 16 - 63 U/L   hCG, quantitative, pregnancy   Result Value Ref Range    HCG, Beta-Quantitative <1 SEE COMMENT BELOW mIU/mL   Urinalysis with Reflex Culture and Microscopic   Result Value Ref Range    Color, Urine Light-Yellow Light-Yellow, Yellow, Dark-Yellow    Appearance, Urine Turbid (N) Clear    Specific  Gravity, Urine 1.014 1.005 - 1.035    pH, Urine 5.0 5.0, 5.5, 6.0, 6.5, 7.0, 7.5, 8.0    Protein, Urine NEGATIVE NEGATIVE, 10 (TRACE), 20 (TRACE) mg/dL    Glucose, Urine Normal Normal mg/dL    Blood, Urine NEGATIVE NEGATIVE    Ketones, Urine 100 (3+) (A) NEGATIVE mg/dL    Bilirubin, Urine NEGATIVE NEGATIVE    Urobilinogen, Urine Normal Normal mg/dL    Nitrite, Urine NEGATIVE NEGATIVE    Leukocyte Esterase, Urine 250 Vicky/µL (A) NEGATIVE   Microscopic Only, Urine   Result Value Ref Range    WBC, Urine 11-20 (A) 1-5, NONE /HPF    RBC, Urine NONE NONE, 1-2, 3-5 /HPF    Squamous Epithelial Cells, Urine 1-9 (SPARSE) Reference range not established. /HPF    Mucus, Urine FEW Reference range not established. /LPF   POCT GLUCOSE   Result Value Ref Range    POCT Glucose 119 (H) 74 - 99 mg/dL      Lab Results   Component Value Date    HGBA1C 6.6 (H) 01/15/2024      CT abdomen pelvis w IV contrast    Result Date: 3/6/2024  Interpreted By:  Jessica Monteiro, STUDY: CT ABDOMEN PELVIS W IV CONTRAST;  3/6/2024 8:00 pm   INDICATION: Signs/Symptoms:RUQ and RLQ abdominal pain.   COMPARISON: Correlation with same-day gallbladder ultrasound   ACCESSION NUMBER(S): [id number]   ORDERING CLINICIAN: ANA BARTLETT   TECHNIQUE: Axial CT images of the abdomen and pelvis with coronal and sagittal reconstructed images obtained after intravenous administration of contrast   FINDINGS: LOWER CHEST: No acute abnormality of the lung bases. BONES: No acute osseous abnormality. ABDOMINAL WALL: Within normal limits.   ABDOMEN:   LIVER: Within normal limits. BILE DUCTS: No biliary dilatation. GALLBLADDER: No calcified gallstones. No pericholecystic inflammatory changes. PANCREAS: Within normal limits. SPLEEN: Within normal limits. ADRENALS: Within normal limits. KIDNEYS and URETERS: Symmetric renal enhancement. No hydronephrosis or perinephric fluid collection. Bilateral nonobstructing renal calculi measuring to 3 mm.     VESSELS: No aortic aneurysm.  RETROPERITONEUM: No pathologically enlarged retroperitoneal lymph nodes.   PELVIS:   REPRODUCTIVE ORGANS: IUD within the uterus. BLADDER: Within normal limits.   BOWEL: No dilated bowel.  The appendix is dilated and fluid-filled. Appendicoliths are present measuring up to 8 mm. Minimal periappendiceal fat stranding. PERITONEUM: No ascites or free air, no fluid collection.       Acute appendicitis. No periappendiceal abscess or evidence of perforation.       MACRO: None   Signed by: Jessica Monteiro 3/6/2024 8:26 PM Dictation workstation:   [id number]    US gallbladder    Result Date: 3/6/2024  Interpreted By:  Jessica Monteiro, STUDY: US GALLBLADDER;  3/6/2024 7:55 pm   INDICATION: Signs/Symptoms:RUQ and RLQ pain, worse in RLQ, worsening for a few days.   COMPARISON: Correlation with same-day CT abdomen pelvis.   ACCESSION NUMBER(S): [id number]   ORDERING CLINICIAN: ANA BARTLETT   TECHNIQUE: Grayscale and color Doppler sonographic imaging of the right upper quadrant.   FINDINGS: LIVER: Normal size. Normal echogenicity, and echotexture. No focal abnormalities.   BILE DUCTS: No intrahepatic or extrahepatic bile duct dilatation. Extrahepatic bile duct = 2 mm.   GALLBLADDER: No stones, pericholecystic fluid, wall thickening, or localized tenderness.   PANCREAS: Normal head and body. Limited evaluation of the pancreatic tail due to bowel gas.   RIGHT KIDNEY: Normal size, no hydronephrosis. There is a punctate echogenic shadowing focus in the upper pole right kidney, suspicious for small nonobstructing calculus.   PERITONEUM: No upper abdominal ascites.       Unremarkable right upper quadrant ultrasound.   Small nonobstructing right renal calculus.   MACRO: None   Signed by: Jessica Monteiro 3/6/2024 8:20 PM Dictation workstation:   [id number]      Principal Problem:    Appendicitis  Active Problems:    Acute appendicitis, uncomplicated     Assessment/Plan   Principal Problem:    Appendicitis  Active Problems:    Acute  appendicitis, uncomplicated    Plan for lap appy.          Lacey Wilkins MD

## 2024-03-07 NOTE — CARE PLAN
The patient's goals for the shift include manage pain    The clinical goals for the shift include manage pain      Problem: Pain  Goal: Takes deep breaths with improved pain control throughout the shift  Outcome: Progressing  Goal: Turns in bed with improved pain control throughout the shift  Outcome: Progressing  Goal: Walks with improved pain control throughout the shift  Outcome: Progressing  Goal: Performs ADL's with improved pain control throughout shift  Outcome: Progressing  Goal: Participates in PT with improved pain control throughout the shift  Outcome: Progressing  Goal: Free from opioid side effects throughout the shift  Outcome: Progressing  Goal: Free from acute confusion related to pain meds throughout the shift  Outcome: Progressing

## 2024-03-07 NOTE — NURSING NOTE
Patient arrived from ED to room 441. Patient oriented to room, call light functions and nursing staff. Patient states that her abdominal pain is 6/10 and she was just medicated with Morphine IV before coming up. No nausea at this time. IV fluids infusing as ordered. Call light within reach.

## 2024-03-08 LAB — BACTERIA UR CULT: NORMAL

## 2024-03-11 LAB
LABORATORY COMMENT REPORT: NORMAL
PATH REPORT.FINAL DX SPEC: NORMAL
PATH REPORT.GROSS SPEC: NORMAL
PATH REPORT.RELEVANT HX SPEC: NORMAL
PATH REPORT.TOTAL CANCER: NORMAL

## 2024-03-19 ENCOUNTER — OFFICE VISIT (OUTPATIENT)
Dept: SURGERY | Facility: CLINIC | Age: 34
End: 2024-03-19
Payer: COMMERCIAL

## 2024-03-19 VITALS
RESPIRATION RATE: 17 BRPM | HEIGHT: 62 IN | TEMPERATURE: 97.7 F | SYSTOLIC BLOOD PRESSURE: 131 MMHG | HEART RATE: 65 BPM | WEIGHT: 200 LBS | DIASTOLIC BLOOD PRESSURE: 90 MMHG | BODY MASS INDEX: 36.8 KG/M2

## 2024-03-19 DIAGNOSIS — Z09 POSTOPERATIVE EXAMINATION: Primary | ICD-10-CM

## 2024-03-19 PROCEDURE — 1036F TOBACCO NON-USER: CPT | Performed by: SURGERY

## 2024-03-19 PROCEDURE — 3008F BODY MASS INDEX DOCD: CPT | Performed by: SURGERY

## 2024-03-19 PROCEDURE — 99024 POSTOP FOLLOW-UP VISIT: CPT | Performed by: SURGERY

## 2024-03-19 ASSESSMENT — PATIENT HEALTH QUESTIONNAIRE - PHQ9
2. FEELING DOWN, DEPRESSED OR HOPELESS: NOT AT ALL
SUM OF ALL RESPONSES TO PHQ9 QUESTIONS 1 & 2: 0
1. LITTLE INTEREST OR PLEASURE IN DOING THINGS: NOT AT ALL

## 2024-03-19 ASSESSMENT — ENCOUNTER SYMPTOMS
RESPIRATORY NEGATIVE: 1
MUSCULOSKELETAL NEGATIVE: 1
CONSTITUTIONAL NEGATIVE: 1
OCCASIONAL FEELINGS OF UNSTEADINESS: 0
NEUROLOGICAL NEGATIVE: 1
DEPRESSION: 0
PSYCHIATRIC NEGATIVE: 1
EYES NEGATIVE: 1
GASTROINTESTINAL NEGATIVE: 1
LOSS OF SENSATION IN FEET: 0
HEMATOLOGIC/LYMPHATIC NEGATIVE: 1
CARDIOVASCULAR NEGATIVE: 1
ENDOCRINE NEGATIVE: 1

## 2024-03-19 ASSESSMENT — LIFESTYLE VARIABLES
HOW OFTEN DO YOU HAVE SIX OR MORE DRINKS ON ONE OCCASION: NEVER
AUDIT-C TOTAL SCORE: 2
HOW OFTEN DO YOU HAVE A DRINK CONTAINING ALCOHOL: 2-4 TIMES A MONTH
SKIP TO QUESTIONS 9-10: 1
HOW MANY STANDARD DRINKS CONTAINING ALCOHOL DO YOU HAVE ON A TYPICAL DAY: 1 OR 2

## 2024-03-19 ASSESSMENT — PAIN SCALES - GENERAL: PAINLEVEL: 0-NO PAIN

## 2024-03-19 ASSESSMENT — COLUMBIA-SUICIDE SEVERITY RATING SCALE - C-SSRS
2. HAVE YOU ACTUALLY HAD ANY THOUGHTS OF KILLING YOURSELF?: NO
6. HAVE YOU EVER DONE ANYTHING, STARTED TO DO ANYTHING, OR PREPARED TO DO ANYTHING TO END YOUR LIFE?: NO
1. IN THE PAST MONTH, HAVE YOU WISHED YOU WERE DEAD OR WISHED YOU COULD GO TO SLEEP AND NOT WAKE UP?: NO

## 2024-03-19 NOTE — PROGRESS NOTES
I was assigned to this patient's chart in error, I did not participate in the care of this patient.     Electronically signed by Antonio Hobbs MD on 7/19/19 at 4:26 PM       Antonio Hobbs MD  07/19/19 [address], [place]  07/19/19 5156 Subjective   Patient ID: Tracy Villalobos is a 33 y.o. female who presents for Post-op (POST-OP FOLLOW UP ON LAP APPENDECTOMY DONE ON 3/7/2024).  HPI  Patient feeling well. Eating, having bowel movements. Incisions well healing.     Review of Systems   Constitutional: Negative.    HENT: Negative.     Eyes: Negative.    Respiratory: Negative.     Cardiovascular: Negative.    Gastrointestinal: Negative.    Endocrine: Negative.    Genitourinary: Negative.    Musculoskeletal: Negative.    Skin: Negative.    Neurological: Negative.    Hematological: Negative.    Psychiatric/Behavioral: Negative.         Objective   Physical Exam  Constitutional:       Appearance: Normal appearance.   HENT:      Head: Normocephalic.      Nose: Nose normal.      Mouth/Throat:      Mouth: Mucous membranes are dry.   Eyes:      Extraocular Movements: Extraocular movements intact.      Pupils: Pupils are equal, round, and reactive to light.   Cardiovascular:      Rate and Rhythm: Normal rate.      Pulses: Normal pulses.   Pulmonary:      Effort: Pulmonary effort is normal.   Abdominal:      General: Abdomen is flat.      Palpations: Abdomen is soft.      Comments: Incisions x 3 well healing   Musculoskeletal:         General: Normal range of motion.      Cervical back: Normal range of motion.   Skin:     General: Skin is warm and dry.   Neurological:      Mental Status: She is alert and oriented to person, place, and time.   Psychiatric:         Mood and Affect: Mood normal.         Behavior: Behavior normal.         Assessment/Plan   Diagnoses and all orders for this visit:  Postoperative examination    Patient well healing, 3 more weeks of no heavy lifting. Follow up prn.     Pathology consistent with acute appendicitis.     Lacey Wilkins MD 03/19/24 4:07 PM

## 2024-03-22 ENCOUNTER — APPOINTMENT (OUTPATIENT)
Dept: OBSTETRICS AND GYNECOLOGY | Facility: CLINIC | Age: 34
End: 2024-03-22
Payer: COMMERCIAL

## 2024-05-09 ENCOUNTER — PROCEDURE VISIT (OUTPATIENT)
Dept: OBSTETRICS AND GYNECOLOGY | Facility: CLINIC | Age: 34
End: 2024-05-09
Payer: COMMERCIAL

## 2024-05-09 VITALS
HEIGHT: 62 IN | DIASTOLIC BLOOD PRESSURE: 84 MMHG | BODY MASS INDEX: 35.88 KG/M2 | WEIGHT: 195 LBS | SYSTOLIC BLOOD PRESSURE: 116 MMHG

## 2024-05-09 DIAGNOSIS — B97.7 HIGH RISK HPV INFECTION: Primary | ICD-10-CM

## 2024-05-09 PROCEDURE — 88305 TISSUE EXAM BY PATHOLOGIST: CPT | Mod: TC,SUR,WESLAB | Performed by: OBSTETRICS & GYNECOLOGY

## 2024-05-09 PROCEDURE — 88305 TISSUE EXAM BY PATHOLOGIST: CPT | Performed by: PATHOLOGY

## 2024-05-09 PROCEDURE — 57455 BIOPSY OF CERVIX W/SCOPE: CPT | Performed by: OBSTETRICS & GYNECOLOGY

## 2024-05-09 ASSESSMENT — ENCOUNTER SYMPTOMS
DEPRESSION: 0
OCCASIONAL FEELINGS OF UNSTEADINESS: 0
LOSS OF SENSATION IN FEET: 0

## 2024-05-09 ASSESSMENT — LIFESTYLE VARIABLES
HOW MANY STANDARD DRINKS CONTAINING ALCOHOL DO YOU HAVE ON A TYPICAL DAY: 1 OR 2
HOW OFTEN DO YOU HAVE A DRINK CONTAINING ALCOHOL: 2-4 TIMES A MONTH
AUDIT-C TOTAL SCORE: 2
SKIP TO QUESTIONS 9-10: 1
SKIP TO QUESTIONS 9-10: 1
HOW MANY STANDARD DRINKS CONTAINING ALCOHOL DO YOU HAVE ON A TYPICAL DAY: 1 OR 2
HOW OFTEN DO YOU HAVE A DRINK CONTAINING ALCOHOL: 2-4 TIMES A MONTH
HOW OFTEN DO YOU HAVE SIX OR MORE DRINKS ON ONE OCCASION: NEVER
AUDIT-C TOTAL SCORE: 2
HOW OFTEN DO YOU HAVE SIX OR MORE DRINKS ON ONE OCCASION: NEVER

## 2024-05-09 ASSESSMENT — PAIN SCALES - GENERAL: PAINLEVEL: 0-NO PAIN

## 2024-05-09 NOTE — PROGRESS NOTES
Here for colpo; 2024 pap  NEG; pos hpv 18      Colposcopy    Date/Time: 5/9/2024 9:47 AM    Performed by: Michelle Vallejo MD  Authorized by: Michelle Vallejo MD    Consent:     Patient questions answered: yes      Risks and benefits of the procedure and its alternatives discussed: yes      Procedural risks discussed:  Bleeding and repeat procedure    Consent obtained:  Verbal    Consent given by:  Patient  Procedure:     Colposcopy with: cervical biopsy      Colposcopy details:  NO abnormal vessel paterrns; pale AW chages from 11-1/ant rim T zone.    Cervix visibility: fully visualized      SCJ visibility: fully visualized      Lesion visualized: fully visualized      Acetowhite lesion(s): cervix      Monsel's solution applied: silver nitrate applied x 2.    Post-procedure:     Patient tolerance of procedure:  Patient tolerated the procedure well with no immediate complications    Estimated blood loss (mL):  1    Instructions and paperwork completed: yes

## 2024-11-18 ENCOUNTER — TELEPHONE (OUTPATIENT)
Dept: OBSTETRICS AND GYNECOLOGY | Facility: CLINIC | Age: 34
End: 2024-11-18
Payer: COMMERCIAL

## 2024-11-18 NOTE — TELEPHONE ENCOUNTER
Per Dr Vallejo / ok to sched tomorrow at 11:30 am / Pt sched appt tomorrow for string check / pt then cancelled due to work sched / pt states that she will keep the appt next week 11/26/2024 with KB /pt will call later in the week to check if any  cancellations .

## 2024-11-18 NOTE — TELEPHONE ENCOUNTER
Est KB pt last annual 01/26/2024/ colpo 05/09/2024 wc- pt calling state that she has new partner / had intercourse 2 days ago and yesterday had spotting / today wearing a liner with small amt of red blood / denies pain / lmp 11/01/2024 / Pt has Mierna IUD / pt unable to do self string check /appt sched for 11/26/2024 / advised if bleeding would become heavy saturating a heavy over night pad within a hour she would need to go to ER /

## 2024-11-19 ENCOUNTER — APPOINTMENT (OUTPATIENT)
Dept: OBSTETRICS AND GYNECOLOGY | Facility: CLINIC | Age: 34
End: 2024-11-19
Payer: COMMERCIAL

## 2024-11-26 ENCOUNTER — APPOINTMENT (OUTPATIENT)
Dept: OBSTETRICS AND GYNECOLOGY | Facility: CLINIC | Age: 34
End: 2024-11-26
Payer: COMMERCIAL

## 2025-01-03 ENCOUNTER — APPOINTMENT (OUTPATIENT)
Dept: OBSTETRICS AND GYNECOLOGY | Facility: CLINIC | Age: 35
End: 2025-01-03
Payer: COMMERCIAL

## 2025-01-21 ENCOUNTER — APPOINTMENT (OUTPATIENT)
Dept: PRIMARY CARE | Facility: CLINIC | Age: 35
End: 2025-01-21
Payer: COMMERCIAL

## 2025-02-14 ENCOUNTER — OFFICE VISIT (OUTPATIENT)
Dept: URGENT CARE | Age: 35
End: 2025-02-14
Payer: COMMERCIAL

## 2025-02-14 VITALS
TEMPERATURE: 98.6 F | BODY MASS INDEX: 35.88 KG/M2 | WEIGHT: 195 LBS | HEIGHT: 62 IN | DIASTOLIC BLOOD PRESSURE: 95 MMHG | SYSTOLIC BLOOD PRESSURE: 134 MMHG | OXYGEN SATURATION: 99 % | RESPIRATION RATE: 20 BRPM | HEART RATE: 88 BPM

## 2025-02-14 DIAGNOSIS — J10.1 INFLUENZA A: ICD-10-CM

## 2025-02-14 LAB
POC RAPID INFLUENZA A: POSITIVE
POC RAPID INFLUENZA B: NEGATIVE
POC RAPID STREP: NEGATIVE
POC SARS-COV-2 AG BINAX: NORMAL

## 2025-02-14 PROCEDURE — 87804 INFLUENZA ASSAY W/OPTIC: CPT | Performed by: SURGERY

## 2025-02-14 PROCEDURE — 3008F BODY MASS INDEX DOCD: CPT | Performed by: SURGERY

## 2025-02-14 PROCEDURE — 1036F TOBACCO NON-USER: CPT | Performed by: SURGERY

## 2025-02-14 PROCEDURE — 87811 SARS-COV-2 COVID19 W/OPTIC: CPT | Performed by: SURGERY

## 2025-02-14 PROCEDURE — 87880 STREP A ASSAY W/OPTIC: CPT | Performed by: SURGERY

## 2025-02-14 PROCEDURE — 99203 OFFICE O/P NEW LOW 30 MIN: CPT | Performed by: SURGERY

## 2025-02-14 RX ORDER — OSELTAMIVIR PHOSPHATE 75 MG/1
75 CAPSULE ORAL EVERY 12 HOURS
Qty: 10 CAPSULE | Refills: 0 | Status: SHIPPED | OUTPATIENT
Start: 2025-02-14 | End: 2025-02-19

## 2025-02-14 RX ORDER — PREDNISONE 50 MG/1
50 TABLET ORAL DAILY
Qty: 3 TABLET | Refills: 0 | Status: SHIPPED | OUTPATIENT
Start: 2025-02-14 | End: 2025-02-17

## 2025-02-14 NOTE — PROGRESS NOTES
Chief Complaint   Patient presents with    Cough     Cough, congestion, sore throat, lethargic, loss of voice x 2 days.       Physical Exam:     GEN: No acute distress    ENT: Bilateral TMs and canals unremarkable, sinus congestion present. Pharynx and tonsils mildly hyperemic but without exudate.     Resp: Lungs clear to auscultation bilaterally       POC Labs:     Office Visit on 02/14/2025   Component Date Value Ref Range Status    POC Rapid Strep 02/14/2025 Negative  Negative Final    POC PRANAY-COV-2 AG 02/14/2025 Presumptive negative test for SARS-CoV-2 (no antigen detected)  Presumptive negative test for SARS-CoV-2 (no antigen detected) Final    POC Rapid Influenza A 02/14/2025 Positive (A)  Negative Final    POC Rapid Influenza B 02/14/2025 Negative  Negative Final        Encounter Diagnosis   Name Primary?    Influenza A         Medical Decision Making & Plan:     Tamiflu  Prednisone      02/14/25 at 12:52 PM - Lida Quinn, DO

## 2025-02-21 ENCOUNTER — OFFICE VISIT (OUTPATIENT)
Dept: OBSTETRICS AND GYNECOLOGY | Facility: CLINIC | Age: 35
End: 2025-02-21
Payer: COMMERCIAL

## 2025-02-21 VITALS
HEART RATE: 64 BPM | BODY MASS INDEX: 36.77 KG/M2 | HEIGHT: 62 IN | SYSTOLIC BLOOD PRESSURE: 128 MMHG | DIASTOLIC BLOOD PRESSURE: 83 MMHG | OXYGEN SATURATION: 98 % | WEIGHT: 199.8 LBS

## 2025-02-21 DIAGNOSIS — Z01.419 ENCOUNTER FOR ANNUAL ROUTINE GYNECOLOGICAL EXAMINATION: Primary | ICD-10-CM

## 2025-02-21 DIAGNOSIS — Z30.431 INTRAUTERINE DEVICE SURVEILLANCE: ICD-10-CM

## 2025-02-21 DIAGNOSIS — R87.810 CERVICAL HIGH RISK HPV (HUMAN PAPILLOMAVIRUS) TEST POSITIVE: ICD-10-CM

## 2025-02-21 DIAGNOSIS — Z12.31 ENCOUNTER FOR MAMMOGRAM TO ESTABLISH BASELINE MAMMOGRAM: ICD-10-CM

## 2025-02-21 PROCEDURE — 99395 PREV VISIT EST AGE 18-39: CPT

## 2025-02-21 PROCEDURE — 3008F BODY MASS INDEX DOCD: CPT

## 2025-02-21 ASSESSMENT — LIFESTYLE VARIABLES
AUDIT-C TOTAL SCORE: 2
HOW OFTEN DO YOU HAVE SIX OR MORE DRINKS ON ONE OCCASION: LESS THAN MONTHLY
HOW MANY STANDARD DRINKS CONTAINING ALCOHOL DO YOU HAVE ON A TYPICAL DAY: 1 OR 2
HOW OFTEN DO YOU HAVE A DRINK CONTAINING ALCOHOL: MONTHLY OR LESS
SKIP TO QUESTIONS 9-10: 0

## 2025-02-21 ASSESSMENT — SOCIAL DETERMINANTS OF HEALTH (SDOH)
WITHIN THE LAST YEAR, HAVE YOU BEEN HUMILIATED OR EMOTIONALLY ABUSED IN OTHER WAYS BY YOUR PARTNER OR EX-PARTNER?: NO
WITHIN THE LAST YEAR, HAVE YOU BEEN AFRAID OF YOUR PARTNER OR EX-PARTNER?: NO
WITHIN THE LAST YEAR, HAVE TO BEEN RAPED OR FORCED TO HAVE ANY KIND OF SEXUAL ACTIVITY BY YOUR PARTNER OR EX-PARTNER?: NO
WITHIN THE LAST YEAR, HAVE YOU BEEN KICKED, HIT, SLAPPED, OR OTHERWISE PHYSICALLY HURT BY YOUR PARTNER OR EX-PARTNER?: NO

## 2025-02-21 ASSESSMENT — ENCOUNTER SYMPTOMS
OCCASIONAL FEELINGS OF UNSTEADINESS: 0
DEPRESSION: 0
LOSS OF SENSATION IN FEET: 0

## 2025-02-21 ASSESSMENT — PAIN SCALES - GENERAL: PAINLEVEL_OUTOF10: 0-NO PAIN

## 2025-02-21 NOTE — PROGRESS NOTES
"Subjective   Tracy Villalobos is a 34 y.o. female who is here for a routine GYN exam. I last saw her 2024.    -Mirena IUD in place since ; inserted at outside facility; last pelvic US 2024 with IUD in appropriate position; pt states partner can intermittently feel strings; she gets once monthly bleeds for about 7 days each month since IUD was inserted.  -Fam hx breast cancer (MGM in her 70s); she requests a baseline mammogram.    Complaints:   none  Periods: regular   Dysmenorrhea:  none    Current contraception: Mirena IUD   History of abnormal Pap smear: yes   - 2024 neg cytology, pos HPV 18; colpo 2024 c/w cervical bx VONDA 1  History of abnormal mammogram: no      OB History          2    Para   1    Term   1            AB   1    Living   1         SAB        IAB        Ectopic        Multiple        Live Births   1                  Review of Systems   Constitutional:  Negative for chills, fatigue, fever and unexpected weight change.   Respiratory:  Negative for cough and shortness of breath.    Gastrointestinal:  Negative for abdominal pain, nausea and vomiting.   Genitourinary:  Negative for dyspareunia, dysuria, pelvic pain and vaginal discharge.   Skin:  Negative for color change and rash.   Neurological:  Negative for dizziness and headaches.       Objective   /83   Pulse 64   Ht 1.575 m (5' 2\")   Wt 90.6 kg (199 lb 12.8 oz)   LMP 2025 (Exact Date)   SpO2 98%   BMI 36.54 kg/m²        General:   Alert and oriented, in no acute distress   Neck: Supple. No visible thyromegaly.    Breast/Axilla: Normal to palpation bilaterally without masses, skin changes, or nipple discharge.    Abdomen: Soft, non-tender, without masses or organomegaly   Vulva: Normal architecture without erythema, masses, or lesions.    Vagina: Normal mucosa without lesions, masses, or atrophy. No abnormal vaginal discharge.    Cervix: Normal without masses, lesions, or signs of cervicitis; unable to " visualize iud strings; pap performed    Uterus: Normal, mobile, non-enlarged uterus   Adnexa: Normal without masses or lesions   Pelvic Floor Normal    Psych Normal affect. Normal mood.      Assessment/Plan   Diagnoses and all orders for this visit:  Encounter for annual routine gynecological examination  -     THINPREP PAP TEST  Cervical high risk HPV (human papillomavirus) test positive  -     THINPREP PAP TEST  - 1/2024 neg cytology, pos HPV 18; colpo 5/2024 c/w cervical bx VONDA 1; due for 1 yr repeat pap.  Encounter for mammogram to establish baseline mammogram  -     BI mammo bilateral screening tomosynthesis; Future  Intrauterine device surveillance   - Mirena IUD in place since 2021; effective until 2029. Pelvic US 1/2024 in appropriate position.     Riya Solano PA-C

## 2025-02-23 ASSESSMENT — ENCOUNTER SYMPTOMS
NAUSEA: 0
SHORTNESS OF BREATH: 0
DYSURIA: 0
UNEXPECTED WEIGHT CHANGE: 0
FEVER: 0
CHILLS: 0
COUGH: 0
HEADACHES: 0
VOMITING: 0
FATIGUE: 0
ABDOMINAL PAIN: 0
COLOR CHANGE: 0
DIZZINESS: 0

## 2025-03-07 LAB
CYTOLOGY CMNT CVX/VAG CYTO-IMP: NORMAL
HPV HR 12 DNA GENITAL QL NAA+PROBE: POSITIVE
HPV HR GENOTYPES PNL CVX NAA+PROBE: POSITIVE
HPV16 DNA SPEC QL NAA+PROBE: NEGATIVE
HPV18 DNA SPEC QL NAA+PROBE: NEGATIVE
LAB AP HPV GENOTYPE QUESTION: YES
LAB AP HPV HR: NORMAL
LAB AP PREVIOUS ABNORMAL HISTORY: NORMAL
LABORATORY COMMENT REPORT: NORMAL
LMP START DATE: NORMAL
PATH REPORT.TOTAL CANCER: NORMAL

## 2025-05-06 ENCOUNTER — OFFICE VISIT (OUTPATIENT)
Dept: URGENT CARE | Age: 35
End: 2025-05-06
Payer: COMMERCIAL

## 2025-05-06 VITALS
SYSTOLIC BLOOD PRESSURE: 128 MMHG | WEIGHT: 200 LBS | TEMPERATURE: 98.5 F | DIASTOLIC BLOOD PRESSURE: 86 MMHG | RESPIRATION RATE: 18 BRPM | OXYGEN SATURATION: 99 % | BODY MASS INDEX: 36.8 KG/M2 | HEART RATE: 74 BPM | HEIGHT: 62 IN

## 2025-05-06 DIAGNOSIS — B34.8 RHINOVIRUS: Primary | ICD-10-CM

## 2025-05-06 LAB
POC CORONAVIRUS SARS-COV-2 PCR: NEGATIVE
POC HUMAN RHINOVIRUS PCR: POSITIVE
POC INFLUENZA A VIRUS PCR: NEGATIVE
POC INFLUENZA B VIRUS PCR: NEGATIVE
POC RESPIRATORY SYNCYTIAL VIRUS PCR: NEGATIVE

## 2025-05-06 PROCEDURE — 87631 RESP VIRUS 3-5 TARGETS: CPT | Performed by: PHYSICIAN ASSISTANT

## 2025-05-06 PROCEDURE — 99213 OFFICE O/P EST LOW 20 MIN: CPT | Performed by: PHYSICIAN ASSISTANT

## 2025-05-06 PROCEDURE — 3008F BODY MASS INDEX DOCD: CPT | Performed by: PHYSICIAN ASSISTANT

## 2025-05-06 RX ORDER — METHYLPREDNISOLONE 4 MG/1
TABLET ORAL
Qty: 21 TABLET | Refills: 0 | Status: SHIPPED | OUTPATIENT
Start: 2025-05-06 | End: 2025-05-12

## 2025-05-06 NOTE — PROGRESS NOTES
"Subjective   Patient ID: Tracy Villalobos is a 34 y.o. female. They present today with a chief complaint of Sore Throat, Sinus Problem, Cough, Headache, Generalized Body Aches, Nausea, and Nasal Congestion (CHEST CONGESTION ).    Past Medical History  Allergies as of 05/06/2025 - Reviewed 05/06/2025   Allergen Reaction Noted    Penicillins Rash and Unknown 11/08/2023       Prescriptions Prior to Admission[1]     Medical History[2]    Surgical History[3]     reports that she quit smoking about 12 years ago. Her smoking use included cigarettes. She has never used smokeless tobacco. She reports current alcohol use. She reports that she does not use drugs.    Review of Systems  ROS is negative unless otherwise stated in HPI.         Objective    Vitals:    05/06/25 0921   BP: 128/86   Pulse: 74   Resp: 18   Temp: 36.9 °C (98.5 °F)   TempSrc: Oral   SpO2: 99%   Weight: 90.7 kg (200 lb)   Height: 1.575 m (5' 2\")     No LMP recorded. (Menstrual status: IUD).      VS: As documented in the triage note and EMR flowsheet from this visit was reviewed  General: Well appearing. No acute distress.   Eyes:  Extraocular movements grossly intact. No scleral icterus.   Head: Atraumatic. Normocephalic.     Neck: No meningismus. No gross masses. Full movement through range of motion  ENT: Posterior oropharynx shows no erythema, exudate or edema.  Uvula is midline without edema.  No stridor or trismus. Copious post nasal drip  CV: Regular rhythm. No murmurs, rubs, gallops appreciated.   Resp: Clear to auscultation bilaterally. No respiratory distress.    Skin: Warm, dry. No rashes  Neuro: CN II-VII intact. A&O x3. Speech fluent. Alert. Moving all extremities. Ambulates with normal gait  Psych: Appropriate mood and affect for situation      Point of Care Test & Imaging Results from this visit  Results for orders placed or performed in visit on 05/06/25   POCT SPOTFIRE R/ST Panel Mini w/COVID ([facility]) manually resulted    Specimen: Swab "   Result Value Ref Range    POC Sars-Cov-2 PCR Negative Negative    POC Respiratory Syncytial Virus PCR Negative Negative    POC Influenza A Virus PCR Negative Negative    POC Influenza B Virus PCR Negative Negative    POC Human Rhinovirus PCR Positive (A) Negative      Imaging  No results found.    Cardiology, Vascular, and Other Imaging  No other imaging results found for the past 2 days      Diagnostic study results (if any) were reviewed by Shivani Ca PA-C.    Assessment/Plan   Allergies, medications, history, and pertinent labs/EKGs/Imaging reviewed by Shivani Ca PA-C.     Medical Decision Making  Patient is a 34-year-old female who presents for upper respiratory symptoms including cough, congestion, headache, sore throat.  On examination, patient well-appearing.  Vitals are stable.  Posterior oropharynx reveals postnasal drip.  Cardiopulmonary examination without adventitious breath sounds.  Spot fire testing positive for rhinovirus. Discussed treatment options. Will proceed with treatment with medrol dosepak and saline nasal spray. Patient informed of the diagnosis.  They are agreeable to the plan as discussed above.  Patient given the opportunity to ask questions.  All of the patient's questions were answered. Given precautions in which to seek attention in the emergency department. Discussed follow up with PCP or other appropriate clinician.      Orders and Diagnoses  Diagnoses and all orders for this visit:  Rhinovirus  -     POCT SPOTFIRE R/ST Panel Mini w/COVID ([facility]) manually resulted  -     methylPREDNISolone (Medrol Dospak) 4 mg tablets; Follow schedule on package instructions  -     sodium chloride ([place]) 0.65 % nasal spray; Administer 1 spray into each nostril if needed for congestion.      Medical Admin Record      Patient disposition: Home    Electronically signed by Shivani Ca PA-C  10:27 AM           [1] (Not in a hospital admission)   [2]   Past Medical  History:  Diagnosis Date    Diabetes (Multi)     Hypothyroidism     MEN1 (multiple endocrine neoplasia) (Multi)     Migraines    [3]   Past Surgical History:  Procedure Laterality Date    APPENDECTOMY N/A 03/07/2024    LAPAROSCOPIC    PARATHYROIDECTOMY  10/2019    and nodule removed

## (undated) DEVICE — Device

## (undated) DEVICE — 16FR ADVANCE FOLEY TRAY, LUBRI-SIL, DRAINAGE BAG, ANTI-REFLUX CHAMBER, CONTROL-FIT OUTLET TUBE, STATLOCK STABILIZATION DEVICE

## (undated) DEVICE — SUTURE, VICRYL, 0, 27 IN, UR-6, VIOLET

## (undated) DEVICE — TUBE SET, PNEUMOCLEAR, SMOKE EVACU, HIGH-FLOW

## (undated) DEVICE — CUTTER, LINEAR ARTICULATING 45MM, RELOADS

## (undated) DEVICE — GLOVE, SURGICAL, PROTEXIS PI , 6.5, PF, LF

## (undated) DEVICE — SUTURE, MONOCRYL, 4-0, 27 IN, PS-2, UNDYED

## (undated) DEVICE — SHEARS, HARMONIC CURVED XLONG 45CM

## (undated) DEVICE — RELOAD, LINEAR, 45MM, BLUE, REG TISSUE

## (undated) DEVICE — SLEEVE, KII, Z-THREAD, 5X100CM

## (undated) DEVICE — RETRIEVAL SYSTEM, MONARCH, 10MM DISP ENDOSCOPIC

## (undated) DEVICE — STRIP, SKIN CLOSURE, STERI STRIP, REINFORCED, 0.5 X 4 IN

## (undated) DEVICE — CORD, MONOPOLAR HIGH FREQUENCY, 8MM PLUG, 300CM

## (undated) DEVICE — GLOVE, SURGICAL, PROTEXIS PI BLUE W/NEUTHERA, 6.5, PF, LF

## (undated) DEVICE — CUTTER, LINEAR FLEX ARTICNG 45MM, NO LOAD

## (undated) DEVICE — TROCAR SYSTEM, BALLOON, KII GELPORT, 12 X 100MM

## (undated) DEVICE — CATHETER TRAY, FOLEY, ALOETOUCH, 16FR, 10ML, W/ DRAINBAG

## (undated) DEVICE — ADHESIVE, SKIN, MASTISOL, 2/3 CC VIAL

## (undated) DEVICE — SOLUTION, IRRIGATION, X RX SODIUM CHL 0.9%, 1000ML BTL

## (undated) DEVICE — DRESSING, TRANSPARENT, TEGADERM, 2-3/8 X 2-3/4 IN

## (undated) DEVICE — TROCAR, KII OPTICAL BLADELESS 5MM Z THREAD 100MM LNGTH